# Patient Record
Sex: MALE | Race: WHITE | Employment: UNEMPLOYED | URBAN - METROPOLITAN AREA
[De-identification: names, ages, dates, MRNs, and addresses within clinical notes are randomized per-mention and may not be internally consistent; named-entity substitution may affect disease eponyms.]

---

## 2022-11-18 ENCOUNTER — APPOINTMENT (OUTPATIENT)
Dept: GENERAL RADIOLOGY | Age: 62
DRG: 137 | End: 2022-11-18
Attending: EMERGENCY MEDICINE
Payer: MEDICAID

## 2022-11-18 ENCOUNTER — APPOINTMENT (OUTPATIENT)
Dept: CT IMAGING | Age: 62
DRG: 137 | End: 2022-11-18
Attending: EMERGENCY MEDICINE
Payer: MEDICAID

## 2022-11-18 ENCOUNTER — HOSPITAL ENCOUNTER (INPATIENT)
Age: 62
LOS: 3 days | Discharge: NURSING, REHAB OR LONG-TERM CARE | DRG: 137 | End: 2022-11-21
Attending: EMERGENCY MEDICINE | Admitting: HOSPITALIST
Payer: MEDICAID

## 2022-11-18 ENCOUNTER — HOSPITAL ENCOUNTER (EMERGENCY)
Age: 62
Discharge: STILL ADMITTED OR ADMINISTRATIVE | End: 2022-11-18
Attending: EMERGENCY MEDICINE

## 2022-11-18 DIAGNOSIS — J96.01 ACUTE RESPIRATORY FAILURE WITH HYPOXIA (HCC): Primary | ICD-10-CM

## 2022-11-18 DIAGNOSIS — U07.1 COVID-19: ICD-10-CM

## 2022-11-18 PROBLEM — R41.82 AMS (ALTERED MENTAL STATUS): Status: ACTIVE | Noted: 2022-11-18

## 2022-11-18 LAB
ALBUMIN SERPL-MCNC: 2.9 G/DL (ref 3.5–5)
ALBUMIN/GLOB SERPL: 0.6 {RATIO} (ref 1.1–2.2)
ALP SERPL-CCNC: 81 U/L (ref 45–117)
ALT SERPL-CCNC: 52 U/L (ref 12–78)
ANION GAP SERPL CALC-SCNC: 2 MMOL/L (ref 5–15)
ARTERIAL PATENCY WRIST A: YES
ARTERIAL PATENCY WRIST A: YES
AST SERPL-CCNC: 36 U/L (ref 15–37)
BASE DEFICIT BLDA-SCNC: 0.8 MMOL/L
BASE EXCESS BLDA CALC-SCNC: 0.1 MMOL/L
BASOPHILS # BLD: 0 K/UL (ref 0–0.1)
BASOPHILS NFR BLD: 0 % (ref 0–1)
BDY SITE: ABNORMAL
BDY SITE: ABNORMAL
BILIRUB SERPL-MCNC: 0.4 MG/DL (ref 0.2–1)
BNP SERPL-MCNC: ABNORMAL PG/ML
BREATHS.SPONTANEOUS ON VENT: 16
BUN SERPL-MCNC: 23 MG/DL (ref 6–20)
BUN/CREAT SERPL: 21 (ref 12–20)
CALCIUM SERPL-MCNC: 8.8 MG/DL (ref 8.5–10.1)
CHLORIDE SERPL-SCNC: 102 MMOL/L (ref 97–108)
CO2 SERPL-SCNC: 36 MMOL/L (ref 21–32)
COMMENT, HOLDF: NORMAL
COVID-19 RAPID TEST, COVR: DETECTED
CREAT SERPL-MCNC: 1.12 MG/DL (ref 0.7–1.3)
CRP SERPL-MCNC: 1.24 MG/DL (ref 0–0.6)
DIFFERENTIAL METHOD BLD: ABNORMAL
EOSINOPHIL # BLD: 0 K/UL (ref 0–0.4)
EOSINOPHIL NFR BLD: 0 % (ref 0–7)
ERYTHROCYTE [DISTWIDTH] IN BLOOD BY AUTOMATED COUNT: 16.7 % (ref 11.5–14.5)
FERRITIN SERPL-MCNC: 96 NG/ML (ref 26–388)
FIO2 ON VENT: 50 %
FLUAV AG NPH QL IA: NEGATIVE
FLUBV AG NOSE QL IA: NEGATIVE
GAS FLOW.O2 O2 DELIVERY SYS: 15 L/MIN
GAS FLOW.O2 SETTING OXYMISER: 4 L/MIN
GLOBULIN SER CALC-MCNC: 4.5 G/DL (ref 2–4)
GLUCOSE BLD STRIP.AUTO-MCNC: 187 MG/DL (ref 65–117)
GLUCOSE SERPL-MCNC: 177 MG/DL (ref 65–100)
HCO3 BLDA-SCNC: 29 MMOL/L (ref 22–26)
HCO3 BLDA-SCNC: 33 MMOL/L (ref 22–26)
HCT VFR BLD AUTO: 48.8 % (ref 36.6–50.3)
HGB BLD-MCNC: 14 G/DL (ref 12.1–17)
IMM GRANULOCYTES # BLD AUTO: 0.1 K/UL (ref 0–0.04)
IMM GRANULOCYTES NFR BLD AUTO: 1 % (ref 0–0.5)
IPAP/PIP, IPAPIP: 16
LACTATE SERPL-SCNC: 1.7 MMOL/L (ref 0.4–2)
LDH SERPL L TO P-CCNC: 417 U/L (ref 85–241)
LYMPHOCYTES # BLD: 0.7 K/UL (ref 0.8–3.5)
LYMPHOCYTES NFR BLD: 8 % (ref 12–49)
MAGNESIUM SERPL-MCNC: 2.2 MG/DL (ref 1.6–2.4)
MCH RBC QN AUTO: 26.9 PG (ref 26–34)
MCHC RBC AUTO-ENTMCNC: 28.7 G/DL (ref 30–36.5)
MCV RBC AUTO: 93.7 FL (ref 80–99)
MONOCYTES # BLD: 0.5 K/UL (ref 0–1)
MONOCYTES NFR BLD: 6 % (ref 5–13)
NEUTS SEG # BLD: 7.1 K/UL (ref 1.8–8)
NEUTS SEG NFR BLD: 85 % (ref 32–75)
NRBC # BLD: 0 K/UL (ref 0–0.01)
NRBC BLD-RTO: 0 PER 100 WBC
PCO2 BLDA: 76 MMHG (ref 35–45)
PCO2 BLDA: 98 MMHG (ref 35–45)
PEEP RESPIRATORY: 8 CM[H2O]
PH BLDA: 7.14 [PH] (ref 7.35–7.45)
PH BLDA: 7.21 [PH] (ref 7.35–7.45)
PLATELET # BLD AUTO: 201 K/UL (ref 150–400)
PMV BLD AUTO: 9.1 FL (ref 8.9–12.9)
PO2 BLDA: 124 MMHG (ref 80–100)
PO2 BLDA: 75 MMHG (ref 80–100)
POTASSIUM SERPL-SCNC: 5.3 MMOL/L (ref 3.5–5.1)
PROCALCITONIN SERPL-MCNC: <0.05 NG/ML
PROT SERPL-MCNC: 7.4 G/DL (ref 6.4–8.2)
RBC # BLD AUTO: 5.21 M/UL (ref 4.1–5.7)
RBC MORPH BLD: ABNORMAL
SAMPLES BEING HELD,HOLD: NORMAL
SAO2 % BLD: 91 % (ref 92–97)
SAO2 % BLD: 97 % (ref 92–97)
SAO2% DEVICE SAO2% SENSOR NAME: ABNORMAL
SAO2% DEVICE SAO2% SENSOR NAME: ABNORMAL
SERVICE CMNT-IMP: ABNORMAL
SERVICE CMNT-IMP: ABNORMAL
SODIUM SERPL-SCNC: 140 MMOL/L (ref 136–145)
SOURCE, COVRS: ABNORMAL
SPECIMEN SITE: ABNORMAL
SPECIMEN SITE: ABNORMAL
TROPONIN-HIGH SENSITIVITY: 100 NG/L (ref 0–76)
TROPONIN-HIGH SENSITIVITY: 100 NG/L (ref 0–76)
VENTILATION MODE VENT: ABNORMAL
WBC # BLD AUTO: 8.4 K/UL (ref 4.1–11.1)

## 2022-11-18 PROCEDURE — 83880 ASSAY OF NATRIURETIC PEPTIDE: CPT

## 2022-11-18 PROCEDURE — 99285 EMERGENCY DEPT VISIT HI MDM: CPT

## 2022-11-18 PROCEDURE — 87040 BLOOD CULTURE FOR BACTERIA: CPT

## 2022-11-18 PROCEDURE — 71045 X-RAY EXAM CHEST 1 VIEW: CPT

## 2022-11-18 PROCEDURE — 85025 COMPLETE CBC W/AUTO DIFF WBC: CPT

## 2022-11-18 PROCEDURE — 82728 ASSAY OF FERRITIN: CPT

## 2022-11-18 PROCEDURE — 80053 COMPREHEN METABOLIC PANEL: CPT

## 2022-11-18 PROCEDURE — 84484 ASSAY OF TROPONIN QUANT: CPT

## 2022-11-18 PROCEDURE — 83615 LACTATE (LD) (LDH) ENZYME: CPT

## 2022-11-18 PROCEDURE — 70450 CT HEAD/BRAIN W/O DYE: CPT

## 2022-11-18 PROCEDURE — 74011250636 HC RX REV CODE- 250/636: Performed by: HOSPITALIST

## 2022-11-18 PROCEDURE — 65270000046 HC RM TELEMETRY

## 2022-11-18 PROCEDURE — 36600 WITHDRAWAL OF ARTERIAL BLOOD: CPT

## 2022-11-18 PROCEDURE — 74011250637 HC RX REV CODE- 250/637: Performed by: HOSPITALIST

## 2022-11-18 PROCEDURE — 87449 NOS EACH ORGANISM AG IA: CPT

## 2022-11-18 PROCEDURE — 87635 SARS-COV-2 COVID-19 AMP PRB: CPT

## 2022-11-18 PROCEDURE — 82803 BLOOD GASES ANY COMBINATION: CPT

## 2022-11-18 PROCEDURE — 36415 COLL VENOUS BLD VENIPUNCTURE: CPT

## 2022-11-18 PROCEDURE — 74011000250 HC RX REV CODE- 250: Performed by: EMERGENCY MEDICINE

## 2022-11-18 PROCEDURE — 5A09457 ASSISTANCE WITH RESPIRATORY VENTILATION, 24-96 CONSECUTIVE HOURS, CONTINUOUS POSITIVE AIRWAY PRESSURE: ICD-10-PCS | Performed by: HOSPITALIST

## 2022-11-18 PROCEDURE — 83735 ASSAY OF MAGNESIUM: CPT

## 2022-11-18 PROCEDURE — 74011000250 HC RX REV CODE- 250: Performed by: HOSPITALIST

## 2022-11-18 PROCEDURE — 84145 PROCALCITONIN (PCT): CPT

## 2022-11-18 PROCEDURE — 71275 CT ANGIOGRAPHY CHEST: CPT

## 2022-11-18 PROCEDURE — 87804 INFLUENZA ASSAY W/OPTIC: CPT

## 2022-11-18 PROCEDURE — 74011000636 HC RX REV CODE- 636

## 2022-11-18 PROCEDURE — 96374 THER/PROPH/DIAG INJ IV PUSH: CPT

## 2022-11-18 PROCEDURE — 86140 C-REACTIVE PROTEIN: CPT

## 2022-11-18 PROCEDURE — 74011250636 HC RX REV CODE- 250/636: Performed by: EMERGENCY MEDICINE

## 2022-11-18 PROCEDURE — 82962 GLUCOSE BLOOD TEST: CPT

## 2022-11-18 PROCEDURE — 83605 ASSAY OF LACTIC ACID: CPT

## 2022-11-18 RX ORDER — SODIUM CHLORIDE 0.9 % (FLUSH) 0.9 %
5-40 SYRINGE (ML) INJECTION AS NEEDED
Status: DISCONTINUED | OUTPATIENT
Start: 2022-11-18 | End: 2022-11-21 | Stop reason: HOSPADM

## 2022-11-18 RX ORDER — IPRATROPIUM BROMIDE AND ALBUTEROL SULFATE 2.5; .5 MG/3ML; MG/3ML
3 SOLUTION RESPIRATORY (INHALATION)
Status: DISCONTINUED | OUTPATIENT
Start: 2022-11-18 | End: 2022-11-21 | Stop reason: HOSPADM

## 2022-11-18 RX ORDER — MAGNESIUM SULFATE 100 %
4 CRYSTALS MISCELLANEOUS AS NEEDED
Status: DISCONTINUED | OUTPATIENT
Start: 2022-11-18 | End: 2022-11-21 | Stop reason: HOSPADM

## 2022-11-18 RX ORDER — SODIUM CHLORIDE 9 MG/ML
100 INJECTION, SOLUTION INTRAVENOUS CONTINUOUS
Status: DISCONTINUED | OUTPATIENT
Start: 2022-11-18 | End: 2022-11-21 | Stop reason: HOSPADM

## 2022-11-18 RX ORDER — ALBUTEROL SULFATE 0.83 MG/ML
2.5 SOLUTION RESPIRATORY (INHALATION)
Status: DISCONTINUED | OUTPATIENT
Start: 2022-11-18 | End: 2022-11-21 | Stop reason: SDUPTHER

## 2022-11-18 RX ORDER — GUAIFENESIN/DEXTROMETHORPHAN 100-10MG/5
5 SYRUP ORAL
Status: DISCONTINUED | OUTPATIENT
Start: 2022-11-18 | End: 2022-11-21 | Stop reason: HOSPADM

## 2022-11-18 RX ORDER — ALBUTEROL SULFATE 0.83 MG/ML
10 SOLUTION RESPIRATORY (INHALATION)
Status: COMPLETED | OUTPATIENT
Start: 2022-11-18 | End: 2022-11-18

## 2022-11-18 RX ORDER — ACETAMINOPHEN 325 MG/1
650 TABLET ORAL
Status: DISCONTINUED | OUTPATIENT
Start: 2022-11-18 | End: 2022-11-21 | Stop reason: HOSPADM

## 2022-11-18 RX ORDER — DEXAMETHASONE SODIUM PHOSPHATE 4 MG/ML
6 INJECTION, SOLUTION INTRA-ARTICULAR; INTRALESIONAL; INTRAMUSCULAR; INTRAVENOUS; SOFT TISSUE
Status: COMPLETED | OUTPATIENT
Start: 2022-11-18 | End: 2022-11-18

## 2022-11-18 RX ORDER — MELATONIN
1000 DAILY
Status: DISCONTINUED | OUTPATIENT
Start: 2022-11-19 | End: 2022-11-21 | Stop reason: HOSPADM

## 2022-11-18 RX ORDER — INSULIN LISPRO 100 [IU]/ML
INJECTION, SOLUTION INTRAVENOUS; SUBCUTANEOUS EVERY 6 HOURS
Status: DISCONTINUED | OUTPATIENT
Start: 2022-11-18 | End: 2022-11-21 | Stop reason: HOSPADM

## 2022-11-18 RX ORDER — ENOXAPARIN SODIUM 100 MG/ML
30 INJECTION SUBCUTANEOUS EVERY 12 HOURS
Status: DISCONTINUED | OUTPATIENT
Start: 2022-11-18 | End: 2022-11-21 | Stop reason: HOSPADM

## 2022-11-18 RX ORDER — DEXAMETHASONE SODIUM PHOSPHATE 10 MG/ML
6 INJECTION INTRAMUSCULAR; INTRAVENOUS EVERY 24 HOURS
Status: DISCONTINUED | OUTPATIENT
Start: 2022-11-18 | End: 2022-11-20

## 2022-11-18 RX ORDER — ACETAMINOPHEN 650 MG/1
650 SUPPOSITORY RECTAL
Status: DISCONTINUED | OUTPATIENT
Start: 2022-11-18 | End: 2022-11-21 | Stop reason: HOSPADM

## 2022-11-18 RX ORDER — LANOLIN ALCOHOL/MO/W.PET/CERES
3 CREAM (GRAM) TOPICAL
Status: DISCONTINUED | OUTPATIENT
Start: 2022-11-18 | End: 2022-11-21 | Stop reason: HOSPADM

## 2022-11-18 RX ORDER — ZINC SULFATE 50(220)MG
1 CAPSULE ORAL DAILY
Status: DISCONTINUED | OUTPATIENT
Start: 2022-11-19 | End: 2022-11-21 | Stop reason: HOSPADM

## 2022-11-18 RX ORDER — FACIAL-BODY WIPES
10 EACH TOPICAL DAILY PRN
Status: DISCONTINUED | OUTPATIENT
Start: 2022-11-18 | End: 2022-11-21 | Stop reason: HOSPADM

## 2022-11-18 RX ORDER — DEXTROSE MONOHYDRATE 100 MG/ML
0-250 INJECTION, SOLUTION INTRAVENOUS AS NEEDED
Status: DISCONTINUED | OUTPATIENT
Start: 2022-11-18 | End: 2022-11-21 | Stop reason: HOSPADM

## 2022-11-18 RX ORDER — ONDANSETRON 2 MG/ML
4 INJECTION INTRAMUSCULAR; INTRAVENOUS
Status: DISCONTINUED | OUTPATIENT
Start: 2022-11-18 | End: 2022-11-21 | Stop reason: HOSPADM

## 2022-11-18 RX ORDER — POLYETHYLENE GLYCOL 3350 17 G/17G
17 POWDER, FOR SOLUTION ORAL DAILY PRN
Status: DISCONTINUED | OUTPATIENT
Start: 2022-11-18 | End: 2022-11-21 | Stop reason: HOSPADM

## 2022-11-18 RX ORDER — GUAIFENESIN 600 MG/1
1200 TABLET, EXTENDED RELEASE ORAL 2 TIMES DAILY
Status: DISCONTINUED | OUTPATIENT
Start: 2022-11-18 | End: 2022-11-21 | Stop reason: HOSPADM

## 2022-11-18 RX ORDER — SODIUM CHLORIDE 0.9 % (FLUSH) 0.9 %
5-40 SYRINGE (ML) INJECTION EVERY 8 HOURS
Status: DISCONTINUED | OUTPATIENT
Start: 2022-11-18 | End: 2022-11-21 | Stop reason: HOSPADM

## 2022-11-18 RX ORDER — ARFORMOTEROL TARTRATE 15 UG/2ML
15 SOLUTION RESPIRATORY (INHALATION)
Status: DISCONTINUED | OUTPATIENT
Start: 2022-11-18 | End: 2022-11-20

## 2022-11-18 RX ORDER — ASCORBIC ACID 500 MG
500 TABLET ORAL 2 TIMES DAILY
Status: DISCONTINUED | OUTPATIENT
Start: 2022-11-18 | End: 2022-11-21 | Stop reason: HOSPADM

## 2022-11-18 RX ADMIN — OXYCODONE HYDROCHLORIDE AND ACETAMINOPHEN 500 MG: 500 TABLET ORAL at 17:34

## 2022-11-18 RX ADMIN — DEXAMETHASONE SODIUM PHOSPHATE 6 MG: 10 INJECTION, SOLUTION INTRAMUSCULAR; INTRAVENOUS at 15:33

## 2022-11-18 RX ADMIN — SODIUM CHLORIDE, PRESERVATIVE FREE 10 ML: 5 INJECTION INTRAVENOUS at 16:29

## 2022-11-18 RX ADMIN — GUAIFENESIN 1200 MG: 600 TABLET ORAL at 21:23

## 2022-11-18 RX ADMIN — SODIUM CHLORIDE, PRESERVATIVE FREE 10 ML: 5 INJECTION INTRAVENOUS at 21:24

## 2022-11-18 RX ADMIN — DEXAMETHASONE SODIUM PHOSPHATE 6 MG: 4 INJECTION, SOLUTION INTRAMUSCULAR; INTRAVENOUS at 10:14

## 2022-11-18 RX ADMIN — IOPAMIDOL 80 ML: 755 INJECTION, SOLUTION INTRAVENOUS at 12:11

## 2022-11-18 RX ADMIN — Medication 3 MG: at 21:23

## 2022-11-18 RX ADMIN — ALBUTEROL SULFATE 10 MG: 2.5 SOLUTION RESPIRATORY (INHALATION) at 09:58

## 2022-11-18 RX ADMIN — SODIUM CHLORIDE 100 ML/HR: 9 INJECTION, SOLUTION INTRAVENOUS at 15:27

## 2022-11-18 RX ADMIN — ENOXAPARIN SODIUM 30 MG: 100 INJECTION SUBCUTANEOUS at 15:28

## 2022-11-18 NOTE — H&P
[facility], [place]  [address], [facility] [address]  [phone]         Hospitalist Admission Note        NAME:  Man Montoya   :  1960   MRN:  [id number]    Date/Time:  2022     Patient PCP:  None    Emergency Contact:    Extended Emergency Contact Information  Primary Emergency Contact: None,Given   [place]  Relation: None      Code: Full Code     Isolation Precautions: Droplet Plus, Contact, Droplet, Enteric Contact, Droplet Plus        Subjective:     Chief Complaint: AMS and SOB with COVID-19    History of Present Illness:     Mr. Narda Johnson is a 60 y.o. male with history that includes COPD, BPH, seizure history but no medications, question of diabetes neuropathy with elevated blood sugar and who comes from long-term care facility was diagnosed with COVID-19 and was about to come off isolation today however he was found to have altered mental status along with pulse ox of 55. He was bagged and sent to the ER. He was started on mid flow which initially thought to respond well but continued to desaturate and eventually required BiPAP. At the time of my visit patient was on BiPAP and although he appeared to be somewhat oriented he was somnolent and my review of system and history was very limited by this. Allergies  No Known Allergies    Medications  Prior to Admission medications    Medication Sig Start Date End Date Taking? Authorizing Provider   phenytoin ER (DILANTIN) 100 mg ER capsule Take 3 Caps by mouth nightly. 16   Sis Milner MD   LORazepam (ATIVAN) 2 mg tablet Take 1 Tab by mouth every eight (8) hours as needed for Anxiety.  Max Daily Amount: 6 mg. 16   Sis Milner MD        Past Medical History  Past Medical History:   Diagnosis Date    Diabetes ([address]     Epile[address] ([facility] [address]     ETOH abuse     Hypertension         Past Surgical History  Past Surgical History:   Procedure Laterality Date    HX AMPUTATION      Right, 2 toes    HX KNEE REPLACEMENT      Right       Social History  Social History     Tobacco Use    Smoking status: Every Day     Packs/day: 1.00     Years: 46.00     Pack years: 46.00     Types: Cigarettes    Smokeless tobacco: Not on file   Substance Use Topics    Alcohol use: Yes     Comment: \"two 40's/day\"        Family History  Unable to obtain due to patient's somnolence and BiPAP status      Review of Systems (14 point ROS):    Resp:  shortness of breath  CVS:   denies chest pain  GI:       denies abdominal pain  Unable to obtain the remainder of the 14 point ROS due to BiPAP status and somnolence. Objective:      Visit Vitals  /65   Pulse 71   Temp 97.8 °F (36.6 °C)   Resp 23   Ht 5' 9\" (1.753 m)   Wt 81.6 kg (180 lb)   SpO2 97%   BMI 26.58 kg/m²       Physical Exam:  General: lethargic, respiratory distress, ill-appearing, and chronically ill appearing  Head: Normocephalic, without obvious abnormality, atraumatic  Eyes: PERRL, EOMI, anicteric sclerae, and conjuntiva clear  ENT: lips, mucosa, and tongue normal on limited exam due to BiPAP  Neck: normal, supple, and no tenderness  Lungs: crackles bilaterally, rhonchi bilaterally, wheezing bilaterally, increased respiratory effort, and on BiPAP  Heart: S1, S2 normal, regular rate, and regular rhythm  Abd: not distended, soft, nontender, BS present and normactive  Ext: no cyanosis, bilateral lower extremity 2+ edema, and contracture of left leg.   Also has multiple toe indications on the right foot  Skin: normal skin color  Neuro: Cannot formally assess at this time due to respiratory status, BiPAP, and somnolence  Psych: Patient does not appear anxious but cannot formally assess as described above      Labs:  Recent Labs     11/18/22  0932   WBC 8.4   HGB 14.0   HCT 48.8        Recent Labs     11/18/22  0932      K 5.3*      CO2 36*   *   BUN 23*   CREA 1.12   CA 8.8   MG 2.2   ALB 2.9*   TBILI 0.4   ALT 52 Radiology:  CT HEAD WO CONT    Result Date: 11/18/2022  No acute intracranial abnormality identified. CTA CHEST W OR W WO CONT    Result Date: 11/18/2022  1. No evidence of pulmonary arterial thromboembolism. 2. Small bilateral pleural effusions. There is bilateral lower lobe volume loss. 3. Acute-appearing, nondisplaced fracture of the RIGHT seventh rib. XR CHEST PORT    Result Date: 11/18/2022  Cardiomegaly. Low lung volumes with mild bibasilar atelectasis. I personally reviewed and interpreted the imaging studies and agree with official reading      The ER course, labs, imaging studies, and medications was reviewed by me on: November 18, 2022         Assessment/Plan:      Acute respiratory failure with hypoxia due to COVID-19 AND COPD  Will admit for further workup and treatment of this very ill patient and high risk of decompensation due to  respiratory compromise     COVID-19 order set with DROPLET PLUS precautions, IVFs, antibiotic(s) Azithromycin, Rocephin, and not needed with no pneumonia and normal procalcitonin, Zinc, vitamin C, vitamin D, bronchodilators, Decadron for hypoxia and/or resp. distress, Maintain O2 sats at least 92-94%, supplemental O2 per protocol, supportive care, incentive spirometry, monitor labs, follow inflammatory markers, and consider pulmonary consult if not improving or worsens    COVID-19 virus infection  Supportive care and treat as above    COPD with acute exacerbation due to COVID-19  For the most part treat as above with Decadron, will add inhaled corticosteroids and bronchodilators. Monitor    Acute hypoxic encephalopathy POA  Seems to be improving continue treating hypoxia. Monitor    Elevated troponin and BNP POA  No chest pain but will continue to monitor likely elevated by the infection. Elevated glucose hypoglycemia unsure about history of diabetes type 2 with neuropathy  Monitor blood glucose levels with insulin sliding scale coverage.   No basal coverage for now. Monitor for complications. A1C. Hypertension  Per chart but not taking medications for it.     Hyperkalemia mild 5.3 POA  IV fluids and monitor    Body mass index is 26.58 kg/m².:  25.0 - 29.9:  Overweight    F:   ml/hr  E:  HyperK  N:  ADULT DIET Regular; 3 carb choices (45 gm/meal)     Risk of deterioration: high      Discussed:  Pt's condition, Imaging findings, Lab findings, Assessment, and Care Plan discussed with: Patient, ED Provider, and RN    Prophylaxis:  Lovenox SQ    Probable disposition:  TBD based on hospital course for now will be admitted to stepdown unit      Total time: 75 minutes **I personally saw and examined the patient during this time period**      Date of service:    11/18/2022                ___________________________________________________    Admitting Physician: Fatemeh Fox MD         CC: None

## 2022-11-18 NOTE — ED PROVIDER NOTES
Patient is a 27-year-old male with past medical history significant for diabetes, epilepsy, EtOH dependence in the past, hypertension status post amputation of 2 toes on the right and right knee replacement presenting to the ER via EMS from long-term care facility for evaluation of altered mental status in setting of hypoxia. Apparently patient was on COVID isolation or quarantine at the facility and was due to come off today however when they rounded on him this morning he was found unresponsive in bed with sats in the 50s. Patient is on 3 L nasal cannula at baseline but had sats this low on his normal oxygen. Patient was placed on nonrebreather and had improvement of sensorium had O2 sats of the way here. Past Medical History:   Diagnosis Date    Diabetes ([facility] [address]     Epilepsy ([facility] [address]     ETOH abuse     Hypertension        Past Surgical History:   Procedure Laterality Date    HX AMPUTATION      Right, 2 toes    HX KNEE REPLACEMENT      Right         No family history on file.     Social History     Socioeconomic History    Marital status: SINGLE     Spouse name: Not on file    Number of children: Not on file    Years of education: Not on file    Highest education level: Not on file   Occupational History    Not on file   Tobacco Use    Smoking status: Every Day     Packs/day: 1.00     Years: 46.00     Pack years: 46.00     Types: Cigarettes    Smokeless tobacco: Not on file   Substance and Sexual Activity    Alcohol use: Yes     Comment: \"two 40's/day\"    Drug use: No    Sexual activity: Not on file   Other Topics Concern    Not on file   Social History Narrative    Not on file     Social Determinants of Health     Financial Resource Strain: Not on file   Food Insecurity: Not on file   Transportation Needs: Not on file   Physical Activity: Not on file   Stress: Not on file   Social Connections: Not on file   Intimate Partner Violence: Not on file   Housing Stability: Not on file         ALLERGIES: Patient has no known allergies. Review of Systems   Unable to perform ROS: Severe respiratory distress     Vitals:    11/18/22 1500 11/18/22 1516 11/18/22 1623 11/18/22 1724   BP: 130/71  100/66    Pulse: 69  74    Resp: 26  17    Temp:  97.6 °F (36.4 °C) 97 °F (36.1 °C)    SpO2: 97%  92% 95%   Weight:       Height:                Physical Exam  Vitals and nursing note reviewed. Constitutional:       Appearance: He is obese. HENT:      Head: Normocephalic and atraumatic. Right Ear: External ear normal.      Left Ear: External ear normal.      Mouth/Throat:      Mouth: Mucous membranes are dry. Eyes:      Conjunctiva/sclera: Conjunctivae normal.   Cardiovascular:      Rate and Rhythm: Tachycardia present. Pulmonary:      Effort: Respiratory distress present. Breath sounds: No stridor. Wheezing and rhonchi present. Abdominal:      Palpations: Abdomen is soft. Musculoskeletal:      Cervical back: Neck supple. Comments: Left leg Contracted which per chart is baseline   Skin:     General: Skin is warm and dry. Neurological:      Mental Status: He is alert and oriented to person, place, and time. MDM  Number of Diagnoses or Management Options  Acute respiratory failure with hypoxia ([address]  COVID-19  Diagnosis management comments: ECG obtained at 930 showing sinus rhythm, rate 90, some ectopy noted and baseline artifact, T wave inversions in anterior leads, no prior EKG available for comparison.        Amount and/or Complexity of Data Reviewed  Clinical lab tests: reviewed and ordered  Tests in the radiology section of CPT®: ordered and reviewed  Discuss the patient with other providers: yes  Independent visualization of images, tracings, or specimens: yes    Risk of Complications, Morbidity, and/or Mortality  Presenting problems: high  Diagnostic procedures: high  Management options: high    Patient Progress  Patient progress: improved         Critical Care  Performed by: Chava Menon MD  Authorized by: Rodolfo Allen MD     Critical care provider statement:     Critical care time (minutes):  60    Critical care time was exclusive of:  Separately billable procedures and treating other patients    Critical care was necessary to treat or prevent imminent or life-threatening deterioration of the following conditions:  Respiratory failure    Critical care was time spent personally by me on the following activities:  Blood draw for specimens, development of treatment plan with patient or surrogate, discussions with consultants, evaluation of patient's response to treatment, examination of patient, ordering and performing treatments and interventions, ordering and review of laboratory studies, ordering and review of radiographic studies, pulse oximetry, re-evaluation of patient's condition, review of old charts and obtaining history from patient or surrogate    I assumed direction of critical care for this patient from another provider in my specialty: no      Care discussed with: admitting provider          Jasper Serve Consult for Admission  12:36 PM    ED Room Number: 322/01  Patient Name and age:  Naz Chaudhry 60 y.o.  male  Working Diagnosis:   1. Acute respiratory failure with hypoxia ([address]    2. COVID-19        COVID-19 Suspicion:  yes  Sepsis present:  no  Reassessment needed: no  Code Status:  Full Code  Readmission: no  Isolation Requirements:  yes  Recommended Level of Care:  step down  Department:[facility] ED - [phone]  Other: Is a 27-year-old male with past medical history significant for coPD, past EtOH abuse, dm, hypertension, neuropathy, from long-term care facility for altered mental status associated with significant hypoxia into the 50s. He is COVID-positive and has had rapid improvement of his mentation with mid flow and then. Blood gases are improving.   CTA read is pending

## 2022-11-18 NOTE — PROGRESS NOTES
RRT RN rounded on patient for a DI score of 66- on hi flow. Patient resting in bed eating dinner in no acute distress, on 5L NC with oxygen saturations at 94%. Spoke with primary RN about patient's condition and she denies need for assistance at this time. No further intervention warranted by RRT RN.

## 2022-11-18 NOTE — ED TRIAGE NOTES
Patient presents via EMS from [facility]-     EMS reports the patient has been on Covid-19 quarantine and was supposed to be coming off quarantine today. The NP at the nursing home was rounding when she found the patient with a RR of 6 and a pulse ox of 55%. Patient was bagged by EMS and he had improvement so was placed on a non-rebreather. Patients GCS improved from a 5 to a 9 in route. Patient arrived with a pulse ox of 98% on a non-rebreather and is responsive to verbal stimuli. IV started in the left [facility]- blood glucose of 201- IVF given.

## 2022-11-18 NOTE — PROGRESS NOTES
Spiritual Care Assessment/Progress Note  [address]      NAME: Jose Miguel Vilchis      MRN: [id number]  AGE: 60 y.o. SEX: male  Mandaeism Affiliation: Mu-ism   Language: English     11/18/2022     Total Time (in minutes): 30     Spiritual Assessment begun in [facility] EMERGENCY DEPT through conversation with:         []Patient        [] Family    [] Friend(s)        Reason for Consult: Request by staff     Spiritual beliefs: (Please include comment if needed)     [] Identifies with a benitez tradition:    Mu-ism on file      [] Supported by a benitez community:            [] Claims no spiritual orientation:           [] Seeking spiritual identity:                [] Adheres to an individual form of spirituality:           [x] Not able to assess:                           Identified resources for coping:      [] Prayer                               [] Music                  [] Guided Imagery     [] Family/friends                 [] Pet visits     [] Devotional reading                         [x] Unknown     [] Other:                                               Interventions offered during this visit: (See comments for more details)                Plan of Care:     [] Support spiritual and/or cultural needs    [] Support AMD and/or advance care planning process      [] Support grieving process   [] Coordinate Rites and/or Rituals    [] Coordination with community clergy   [] No spiritual needs identified at this time   [] Detailed Plan of Care below (See Comments)  [] Make referral to Music Therapy  [] Make referral to Pet Therapy     [] Make referral to Addiction services  [] Make referral to [facility]  [] Make referral to Spiritual Care Partner  [] No future visits requested        [x] Contact Spiritual Care for further referrals     Comments:   Emergency Department notified  that Pt arriving by EMS shortly was unresponsive. Pt arrived to ED-01 and is somewhat responsive now.  Medical and raspatory staff is working with Mr Mariely Levin.  consulted with EMS who picked up Mr Joann Spine where he lives in a nursing home/assisted living. He has a brother that has been contacted and people calling to check on him. No family or visitors present as of yet. Consulted with staff and advised nurse to contact [facility] for any further referrals.     Chaplain Lobo Diop M.Div.  Tavo Alexander ([id number])

## 2022-11-19 LAB
25(OH)D3 SERPL-MCNC: 116.6 NG/ML (ref 30–100)
ALBUMIN SERPL-MCNC: 3.1 G/DL (ref 3.5–5)
ALBUMIN/GLOB SERPL: 0.7 {RATIO} (ref 1.1–2.2)
ALP SERPL-CCNC: 74 U/L (ref 45–117)
ALT SERPL-CCNC: 44 U/L (ref 12–78)
ANION GAP SERPL CALC-SCNC: 1 MMOL/L (ref 5–15)
APTT PPP: 21.6 SEC (ref 22.1–31)
AST SERPL-CCNC: 22 U/L (ref 15–37)
BASOPHILS # BLD: 0 K/UL (ref 0–0.1)
BASOPHILS NFR BLD: 0 % (ref 0–1)
BILIRUB SERPL-MCNC: 0.4 MG/DL (ref 0.2–1)
BUN SERPL-MCNC: 21 MG/DL (ref 6–20)
BUN/CREAT SERPL: 25 (ref 12–20)
CALCIUM SERPL-MCNC: 8.8 MG/DL (ref 8.5–10.1)
CHLORIDE SERPL-SCNC: 99 MMOL/L (ref 97–108)
CO2 SERPL-SCNC: 38 MMOL/L (ref 21–32)
CREAT SERPL-MCNC: 0.85 MG/DL (ref 0.7–1.3)
CRP SERPL-MCNC: 1.03 MG/DL (ref 0–0.6)
D DIMER PPP FEU-MCNC: 35.2 MG/L FEU (ref 0–0.65)
DIFFERENTIAL METHOD BLD: ABNORMAL
EOSINOPHIL # BLD: 0 K/UL (ref 0–0.4)
EOSINOPHIL NFR BLD: 0 % (ref 0–7)
ERYTHROCYTE [DISTWIDTH] IN BLOOD BY AUTOMATED COUNT: 17.2 % (ref 11.5–14.5)
EST. AVERAGE GLUCOSE BLD GHB EST-MCNC: 114 MG/DL
FIBRINOGEN PPP-MCNC: 257 MG/DL (ref 200–475)
GLOBULIN SER CALC-MCNC: 4.2 G/DL (ref 2–4)
GLUCOSE BLD STRIP.AUTO-MCNC: 103 MG/DL (ref 65–117)
GLUCOSE BLD STRIP.AUTO-MCNC: 108 MG/DL (ref 65–117)
GLUCOSE BLD STRIP.AUTO-MCNC: 127 MG/DL (ref 65–117)
GLUCOSE BLD STRIP.AUTO-MCNC: 98 MG/DL (ref 65–117)
GLUCOSE SERPL-MCNC: 93 MG/DL (ref 65–100)
HBA1C MFR BLD: 5.6 % (ref 4–5.6)
HCT VFR BLD AUTO: 42.4 % (ref 36.6–50.3)
HGB BLD-MCNC: 12.6 G/DL (ref 12.1–17)
IMM GRANULOCYTES # BLD AUTO: 0 K/UL (ref 0–0.04)
IMM GRANULOCYTES NFR BLD AUTO: 0 % (ref 0–0.5)
INR PPP: 1.2 (ref 0.9–1.1)
LYMPHOCYTES # BLD: 1.3 K/UL (ref 0.8–3.5)
LYMPHOCYTES NFR BLD: 13 % (ref 12–49)
MAGNESIUM SERPL-MCNC: 2.1 MG/DL (ref 1.6–2.4)
MCH RBC QN AUTO: 27.3 PG (ref 26–34)
MCHC RBC AUTO-ENTMCNC: 29.7 G/DL (ref 30–36.5)
MCV RBC AUTO: 92 FL (ref 80–99)
MONOCYTES # BLD: 0.9 K/UL (ref 0–1)
MONOCYTES NFR BLD: 10 % (ref 5–13)
NEUTS SEG # BLD: 7.1 K/UL (ref 1.8–8)
NEUTS SEG NFR BLD: 77 % (ref 32–75)
NRBC # BLD: 0 K/UL (ref 0–0.01)
NRBC BLD-RTO: 0 PER 100 WBC
PLATELET # BLD AUTO: 226 K/UL (ref 150–400)
PMV BLD AUTO: 9.4 FL (ref 8.9–12.9)
POTASSIUM SERPL-SCNC: 4.2 MMOL/L (ref 3.5–5.1)
PROCALCITONIN SERPL-MCNC: 0.16 NG/ML
PROT SERPL-MCNC: 7.3 G/DL (ref 6.4–8.2)
PROTHROMBIN TIME: 12.2 SEC (ref 9–11.1)
RBC # BLD AUTO: 4.61 M/UL (ref 4.1–5.7)
SERVICE CMNT-IMP: ABNORMAL
SERVICE CMNT-IMP: NORMAL
SODIUM SERPL-SCNC: 138 MMOL/L (ref 136–145)
THERAPEUTIC RANGE,PTTT: ABNORMAL SECS (ref 58–77)
TROPONIN-HIGH SENSITIVITY: 93 NG/L (ref 0–76)
UR CULT HOLD, URHOLD: NORMAL
WBC # BLD AUTO: 9.3 K/UL (ref 4.1–11.1)

## 2022-11-19 PROCEDURE — 84145 PROCALCITONIN (PCT): CPT

## 2022-11-19 PROCEDURE — 82962 GLUCOSE BLOOD TEST: CPT

## 2022-11-19 PROCEDURE — 65270000046 HC RM TELEMETRY

## 2022-11-19 PROCEDURE — 74011250637 HC RX REV CODE- 250/637: Performed by: HOSPITALIST

## 2022-11-19 PROCEDURE — 85610 PROTHROMBIN TIME: CPT

## 2022-11-19 PROCEDURE — 83735 ASSAY OF MAGNESIUM: CPT

## 2022-11-19 PROCEDURE — 97166 OT EVAL MOD COMPLEX 45 MIN: CPT

## 2022-11-19 PROCEDURE — 77010033678 HC OXYGEN DAILY

## 2022-11-19 PROCEDURE — 94640 AIRWAY INHALATION TREATMENT: CPT

## 2022-11-19 PROCEDURE — 97535 SELF CARE MNGMENT TRAINING: CPT

## 2022-11-19 PROCEDURE — 82306 VITAMIN D 25 HYDROXY: CPT

## 2022-11-19 PROCEDURE — 87899 AGENT NOS ASSAY W/OPTIC: CPT

## 2022-11-19 PROCEDURE — 86140 C-REACTIVE PROTEIN: CPT

## 2022-11-19 PROCEDURE — 85379 FIBRIN DEGRADATION QUANT: CPT

## 2022-11-19 PROCEDURE — 85730 THROMBOPLASTIN TIME PARTIAL: CPT

## 2022-11-19 PROCEDURE — 97161 PT EVAL LOW COMPLEX 20 MIN: CPT

## 2022-11-19 PROCEDURE — 85025 COMPLETE CBC W/AUTO DIFF WBC: CPT

## 2022-11-19 PROCEDURE — 83036 HEMOGLOBIN GLYCOSYLATED A1C: CPT

## 2022-11-19 PROCEDURE — 84484 ASSAY OF TROPONIN QUANT: CPT

## 2022-11-19 PROCEDURE — 85384 FIBRINOGEN ACTIVITY: CPT

## 2022-11-19 PROCEDURE — 74011000250 HC RX REV CODE- 250: Performed by: HOSPITALIST

## 2022-11-19 PROCEDURE — 36415 COLL VENOUS BLD VENIPUNCTURE: CPT

## 2022-11-19 PROCEDURE — 74011250636 HC RX REV CODE- 250/636: Performed by: HOSPITALIST

## 2022-11-19 PROCEDURE — 80053 COMPREHEN METABOLIC PANEL: CPT

## 2022-11-19 PROCEDURE — 97530 THERAPEUTIC ACTIVITIES: CPT

## 2022-11-19 RX ORDER — LORAZEPAM 2 MG/ML
0.5 INJECTION, SOLUTION INTRAMUSCULAR; INTRAVENOUS
Status: DISCONTINUED | OUTPATIENT
Start: 2022-11-19 | End: 2022-11-21 | Stop reason: HOSPADM

## 2022-11-19 RX ADMIN — Medication 1000 UNITS: at 08:35

## 2022-11-19 RX ADMIN — SODIUM CHLORIDE, PRESERVATIVE FREE 10 ML: 5 INJECTION INTRAVENOUS at 21:11

## 2022-11-19 RX ADMIN — SODIUM CHLORIDE, PRESERVATIVE FREE 10 ML: 5 INJECTION INTRAVENOUS at 13:29

## 2022-11-19 RX ADMIN — TIOTROPIUM BROMIDE AND OLODATEROL 2 PUFF: 3.124; 2.736 SPRAY, METERED RESPIRATORY (INHALATION) at 20:50

## 2022-11-19 RX ADMIN — OXYCODONE HYDROCHLORIDE AND ACETAMINOPHEN 500 MG: 500 TABLET ORAL at 08:35

## 2022-11-19 RX ADMIN — TIOTROPIUM BROMIDE AND OLODATEROL 2 PUFF: 3.124; 2.736 SPRAY, METERED RESPIRATORY (INHALATION) at 02:44

## 2022-11-19 RX ADMIN — Medication 3 MG: at 21:10

## 2022-11-19 RX ADMIN — DEXAMETHASONE SODIUM PHOSPHATE 6 MG: 10 INJECTION, SOLUTION INTRAMUSCULAR; INTRAVENOUS at 13:26

## 2022-11-19 RX ADMIN — ENOXAPARIN SODIUM 30 MG: 100 INJECTION SUBCUTANEOUS at 13:33

## 2022-11-19 RX ADMIN — GUAIFENESIN 1200 MG: 600 TABLET ORAL at 21:10

## 2022-11-19 RX ADMIN — SODIUM CHLORIDE 100 ML/HR: 9 INJECTION, SOLUTION INTRAVENOUS at 17:52

## 2022-11-19 RX ADMIN — ZINC SULFATE 220 MG (50 MG) CAPSULE 1 CAPSULE: CAPSULE at 08:35

## 2022-11-19 RX ADMIN — LORAZEPAM 0.5 MG: 2 INJECTION, SOLUTION INTRAMUSCULAR; INTRAVENOUS at 13:26

## 2022-11-19 RX ADMIN — OXYCODONE HYDROCHLORIDE AND ACETAMINOPHEN 500 MG: 500 TABLET ORAL at 17:53

## 2022-11-19 RX ADMIN — GUAIFENESIN 1200 MG: 600 TABLET ORAL at 08:35

## 2022-11-19 NOTE — PROGRESS NOTES
Bedside and Verbal shift change report given to ZOEY White (oncoming nurse) by Savanna Jackson (offgoing nurse). Report included the following information SBAR, Kardex, ED Summary, MAR, Recent Results, and Cardiac Rhythm SR .     1200- Pt is very agitated has pulled off all leads and one IV, refusing to wear oxygen, and is also not letting us check his blood sugar, 02 stats are dropping down into the 80's, notified Dr. Anyi Asif, orders for ativan have been received. This patient was assisted with Intentional Toileting every 2 hours during this shift as appropriate. Documentation of ambulation and output reflected on Flowsheet as appropriate. Purposeful hourly rounding was completed using AIDET and 5Ps. Outcomes of PHR documented as they occurred. Bed alarm in use as appropriate. Dual Suction and ambubag in place. Bedside and Verbal shift change report given to ZOEY Crawford (oncoming nurse) by Shraddha Awad (offgoing nurse).  Report included the following information SBAR, Kardex, ED Summary, MAR, Recent Results, and Cardiac Rhythm SR .

## 2022-11-19 NOTE — PROGRESS NOTES
[facility]  [address] [facility] [facility], [facility] [address]  [phone]         Hospitalist Progress Note        NAME:  Izzy Vang   :  1960   MRN:  [id number]    Date/Time:  2022     Patient PCP:  None    Emergency Contact:    Extended Emergency Contact Information  Primary Emergency Contact: None,Given   [place]  Relation: None      Code: Full Code     Isolation Precautions: Droplet Plus, Contact, Droplet, Enteric Contact, Droplet Plus        Subjective:     REASON FOR VISIT:  Recheck AHRF / COVID-19     HPI & INTERVAL HISTORY:     Mr. Renata Bamberger is a 60 y.o. male with history that includes COPD presents with AMS due to AHRF requiring BiPAP due to COV-19.     : Patient seen and examined. Has improved and is off BiPAP. He remains somewhat disoriented though I don't know his baseline. Unaware of location and date.       ALLERGIES  No Known Allergies      ROS:  Gen:   malaise  Eyes:  negative  ENT:    negative  Resp:  cough and shortness of breath  CVS:   denies palpitations, CP, dizziness, syncope, edema, PND, GLOVER, orthopnea  GI:       denies abd pain, nausea, vomit, diarrhea, constipation, GERD, melena, hematochezia  :     negative           Objective:      Visit Vitals  BP (!) 131/90 (BP 1 Location: Right lower arm, BP Patient Position: At rest)   Pulse 71   Temp 98.2 °F (36.8 °C)   Resp 20   Ht 5' 9\" (1.753 m)   Wt 81.6 kg (180 lb)   SpO2 99%   BMI 26.58 kg/m²       Physical Exam:  General: alert, no distress, and chronically ill appearing  Head: Normocephalic, without obvious abnormality, atraumatic  Eyes: PERRL, EOMI, anicteric sclerae, and conjuntiva clear  ENT: lips, mucosa, and tongue normal  Neck: normal, supple, and no tenderness  Lungs: rhonchi bilaterally  Heart: S1, S2 normal, regular rate, and regular rhythm  Abd: not distended, soft, nontender, BS present and normactive  Ext:  no cyanosis, bilateral lower extremity 2+ edema, and contracture of left leg.   Also has multiple toe indications on the right foot  Skin: normal skin color, no rashes, and texture normal  Neuro:  alert, oriented to self & somehat to situation only, no defects noted in general exam.  Psych: not anxious, cooperative, appropriate affect       Medications:  Current Facility-Administered Medications   Medication Dose Route Frequency    0.9% sodium chloride infusion  100 mL/hr IntraVENous CONTINUOUS    sodium chloride (NS) flush 5-40 mL  5-40 mL IntraVENous Q8H    sodium chloride (NS) flush 5-40 mL  5-40 mL IntraVENous PRN    polyethylene glycol (MIRALAX) packet 17 g  17 g Oral DAILY PRN    bisacodyL (DULCOLAX) suppository 10 mg  10 mg Rectal DAILY PRN    ondansetron (ZOFRAN) injection 4 mg  4 mg IntraVENous Q6H PRN    ascorbic acid (vitamin C) (VITAMIN C) tablet 500 mg  500 mg Oral BID    zinc sulfate (ZINCATE) 50 mg zinc (220 mg) capsule 1 Capsule  1 Capsule Oral DAILY    cholecalciferol (VITAMIN D3) (1000 Units /25 mcg) tablet 1,000 Units  1,000 Units Oral DAILY    melatonin tablet 3 mg  3 mg Oral QHS    sodium chloride (NS) flush 5-40 mL  5-40 mL IntraVENous Q8H    sodium chloride (NS) flush 5-40 mL  5-40 mL IntraVENous PRN    acetaminophen (TYLENOL) tablet 650 mg  650 mg Oral Q6H PRN    Or    acetaminophen (TYLENOL) suppository 650 mg  650 mg Rectal Q6H PRN    enoxaparin (LOVENOX) injection 30 mg  30 mg SubCUTAneous Q12H    guaiFENesin-dextromethorphan (ROBITUSSIN DM) 100-10 mg/5 mL syrup 5 mL  5 mL Oral Q4H PRN    dexamethasone (PF) (DECADRON) 10 mg/mL injection 6 mg  6 mg IntraVENous Q24H    guaiFENesin ER (MUCINEX) tablet 1,200 mg  1,200 mg Oral BID    arformoteroL (BROVANA) neb solution 15 mcg  15 mcg Nebulization BID RT    albuterol (PROVENTIL VENTOLIN) nebulizer solution 2.5 mg  2.5 mg Nebulization Q2H PRN    [Held by provider] albuterol-ipratropium (DUO-NEB) 2.5 MG-0.5 MG/3 ML  3 mL Nebulization Q6H RT    glucose chewable tablet 16 g  4 Tablet Oral PRN glucagon (GLUCAGEN) injection 1 mg  1 mg IntraMUSCular PRN    dextrose 10% infusion 0-250 mL  0-250 mL IntraVENous PRN    insulin lispro (HUMALOG) injection   SubCUTAneous Q6H    tiotropium-olodateroL (STIOLTO RESPIMAT) 2.5-2.5 mcg/actuation inhaler 2 Puff  2 Puff Inhalation DAILY        Labs:  Recent Labs     11/18/22  0932   WBC 8.4   HGB 14.0   HCT 48.8          Recent Labs     11/18/22  0932      K 5.3*      CO2 36*   *   BUN 23*   CREA 1.12   CA 8.8   MG 2.2   ALB 2.9*   TBILI 0.4   ALT 52             Radiology:  CT HEAD WO CONT    Result Date: 11/18/2022  No acute intracranial abnormality identified. CTA CHEST W OR W WO CONT    Result Date: 11/18/2022  1. No evidence of pulmonary arterial thromboembolism. 2. Small bilateral pleural effusions. There is bilateral lower lobe volume loss. 3. Acute-appearing, nondisplaced fracture of the RIGHT seventh rib. XR CHEST PORT    Result Date: 11/18/2022  Cardiomegaly. Low lung volumes with mild bibasilar atelectasis. I personally reviewed and interpreted the imaging studies and agree with official reading    The labs, imaging studies, and medications was reviewed by me on: November 19, 2022         Assessment/Plan:      Acute respiratory failure with hypoxia due to COVID-19 AND COPD requiring BiPAP on admission  Non-vaccinated    Continue decadron  Zinc, vitamin C, vitamin D, bronchodilators  Consider Baricitinib  Maintain O2 sats at least 92-94%, supplemental O2 per protocol, IVFs, supportive care, incentive spirometry  Monitor labs, follow inflammatory markers  Pulmonary consult     COVID-19 virus infection  Supportive care and treat as above  Consider Baricitinib     COPD with acute exacerbation due to COVID-19  For the most part treat as above with Decadron, will add inhaled corticosteroids and bronchodilators.   Monitor     Acute hypoxic encephalopathy with agitation POA  Improving but not resolved agitated feels that he is in [facility] and was swinging pillow at staff Per nursing  Will give low-dose Ativan as needed and then check sats  Continue treating hypoxia. Monitor     Elevated troponin and BNP POA  Slightly elevated and flat. No  CP. Elevated glucose hypoglycemia unsure about history of diabetes type 2 with neuropathy  Monitor blood glucose levels with insulin sliding scale coverage. No basal coverage for now. Monitor for complications. A1C. Hypertension  Per chart but not taking medications for it.      Hyperkalemia mild 5.3 POA  IV fluids and monitor    Body mass index is 26.58 kg/m².:  25.0 - 29.9:  Overweight    F:  ml/hr  E: Monitor  N: ADULT DIET Regular; 3 carb choices (45 gm/meal)       Risk of deterioration: high      Discussed:  Pt's condition, Imaging findings, Lab findings, Assessment, and Care Plan discussed with: Patient and RN    Prophylaxis:  Lovenox SQ    Anticipated discharge disposition:  LTC    HR DC Barriers: Currently not medically stable for discharge      Total time: -35- minutes **I personally saw and examined the patient during this time period**      Date of service:    11/19/2022                ___________________________________________________    Admitting Physician: Arnav Adame MD

## 2022-11-19 NOTE — PROGRESS NOTES
Problem: Risk for Spread of Infection  Goal: Prevent transmission of infectious organism to others  Description: Prevent the transmission of infectious organisms to other patients, staff members, and visitors.   Outcome: Progressing Towards Goal     Problem: Patient Education:  Go to Education Activity  Goal: Patient/Family Education  Outcome: Not Progressing Towards Goal

## 2022-11-19 NOTE — PROGRESS NOTES
PHYSICAL THERAPY EVALUATION/DISCHARGE  Patient: Raul Byrnes (89 y.o. male)  Date: 11/19/2022  Primary Diagnosis: COVID-19 virus infection [U07.1]  Acute respiratory failure with hypoxia (HCC) [J96.01]  AMS (altered mental status) [R41.82]       Precautions:          ASSESSMENT  Based on the objective data described below, the patient presents with confusion, hallucinating?, generalized weakness, decreased ROM in all extremities, non-functional LLE with contracture at hip and knee, cold to touch R foot with one/two? toes remaining and functional mobility near his perceived baseline following admission +COVID with hypoxia from his nursing home. Pt tangential in conversation requesting a match and transportation to [facility]. \" Attempted EOB though pt unable to achieve given minimal to no ability to flex at hips. Pt unable to report his baseline though anticipate it is bed bound given state of LEs. Total A x 2 for bed mobility. Set up in bed with tray and all needs in reach. Recommend d/c back to prior living situation. Pt is requiring 5L/min supplemental O2 and desaturates to 86% with quick recovery though sensor on cold foot. Functional Outcome Measure: The patient scored 0/28 on the Tinetti outcome measure which is indicative of high fall risk. Other factors to consider for discharge: baseline dependence     Further skilled acute physical therapy is not indicated at this time. PLAN :  Recommendation for discharge: (in order for the patient to meet his/her long term goals)  No skilled physical therapy/ follow up rehabilitation needs identified at this time. This discharge recommendation:  Has been made in collaboration with the attending provider and/or case management    IF patient discharges home will need the following DME: patient owns DME required for discharge       SUBJECTIVE:   Patient stated [place] are [place] police station.     OBJECTIVE DATA SUMMARY:   HISTORY:    Past Medical History: Diagnosis Date    Diabetes ([facility] [address]     Epilepsy ([facility] [address]     ETOH abuse     Hypertension      Past Surgical History:   Procedure Laterality Date    HX AMPUTATION      Right, 2 toes    HX KNEE REPLACEMENT      Right       Prior level of function: from nursing home, non-ambulatory  Personal factors and/or comorbidities impacting plan of care: diabetes, epilepsy, ETOH abuse, smoker, HTN    Home Situation  Home Environment: Long term care [facility])  One/Two Story Residence: One story    EXAMINATION/PRESENTATION/DECISION MAKING:   Critical Behavior:  Neurologic State: Alert, Confused  Orientation Level: Oriented to place, Disoriented to place, Disoriented to situation, Disoriented to time        Hearing:     Skin:    Edema:   Range Of Motion:  AROM: Grossly decreased, non-functional (LLE nonfunctional)                       Strength:    Strength: Generally decreased, functional                    Tone & Sensation:   Tone: Abnormal                              Coordination:  Coordination: Grossly decreased, non-functional  Vision:      Functional Mobility:  Bed Mobility:  Rolling: Total assistance;Assist x2  Supine to Sit: Total assistance;Assist x2  Sit to Supine: Total assistance;Assist x2     Transfers:                             Balance:   Sitting: Impaired (unable to achieve EOB)  Ambulation/Gait Training:                                                         Stairs:               Therapeutic Exercises:       Functional Measure:  Tinetti test:    Sitting Balance: 0  Arises: 0  Attempts to Rise: 0  Immediate Standing Balance: 0  Standing Balance: 0  Nudged: 0  Eyes Closed: 0  Turn 360 Degrees - Continuous/Discontinuous: 0  Turn 360 Degrees - Steady/Unsteady: 0  Sitting Down: 0  Balance Score: 0 Balance total score  Indication of Gait: 0  R Step Length/Height: 0  L Step Length/Height: 0  R Foot Clearance: 0  L Foot Clearance: 0  Step Symmetry: 0  Step Continuity: 0  Path: 0  Trunk: 0  Walking Time: 0  Gait Score: 0 Gait total score  Total Score: 0/28 Overall total score         Tinetti Tool Score Risk of Falls  <19 = High Fall Risk  19-24 = Moderate Fall Risk  25-28 = Low Fall Risk  Tinetti ME. Performance-Oriented Assessment of Mobility Problems in Elderly Patients. [address] 65; [id number]. (Scoring Description: PT Bulletin Feb. 10, 1993)    Older adults: Naz Paredes et al, 2009; n = 1000 [facility] elderly evaluated with ABC, ARVIN, ADL, and IADL)  · Mean ARVIN score for males aged 69-68 years = 26.21(3.40)  · Mean ARVIN score for females age 69-68 years = 25.16(4.30)  · Mean ARVIN score for males over 80 years = 23.29(6.02)  · Mean ARVIN score for females over 80 years = 17.20(8.32)            Physical Therapy Evaluation Charge Determination   History Examination Presentation Decision-Making   HIGH Complexity :3+ comorbidities / personal factors will impact the outcome/ POC  MEDIUM Complexity : 3 Standardized tests and measures addressing body structure, function, activity limitation and / or participation in recreation  LOW Complexity : Stable, uncomplicated  Other outcome measures Tinetti  LOW       Based on the above components, the patient evaluation is determined to be of the following complexity level: LOW     Pain Rating:  none    Activity Tolerance:   Fair and requires rest breaks      After treatment patient left in no apparent distress:   Supine in bed and Call bell within reach    COMMUNICATION/EDUCATION:   The patients plan of care was discussed with: Occupational therapist and Registered nurse. Fall prevention education was provided and the patient/caregiver indicated understanding., Patient/family have participated as able in goal setting and plan of care. , and Patient/family agree to work toward stated goals and plan of care.     Thank you for this referral.  Sruthi Wade, PT   Time Calculation: 24 mins

## 2022-11-19 NOTE — PROGRESS NOTES
TRANSFER - IN REPORT:    Verbal report received from ZOEY Lipscomb(name) on Amos Escudero  being received from ED(unit) for routine progression of care      Report consisted of patients Situation, Background, Assessment and   Recommendations(SBAR). Information from the following report(s) SBAR, Kardex, ED Summary, MAR, Recent Results, and Cardiac Rhythm SR  was reviewed with the receiving nurse. Opportunity for questions and clarification was provided. Assessment completed upon patients arrival to unit and care assumed. This patient was assisted with Intentional Toileting every 2 hours during this shift as appropriate. Documentation of ambulation and output reflected on Flowsheet as appropriate. Purposeful hourly rounding was completed using AIDET and 5Ps. Outcomes of PHR documented as they occurred. Bed alarm in use as appropriate. Dual Suction and ambubag in place. Bedside and Verbal shift change report given to ZOEY Crawford (oncoming nurse) by Dave Sommer (offgoing nurse).  Report included the following information SBAR, Kardex, ED Summary, MAR, Recent Results, and Cardiac Rhythm SR .

## 2022-11-19 NOTE — ROUTINE PROCESS
1900  Bedside shift change report given to [address] (oncoming nurse) by Trevor Warren,  RN (offgoing nurse). Report included the following information SBAR, Kardex, ED Summary, Intake/Output, and Recent Results. 1930  Day team primary and night team praimry rn attempted to turn patient to assess bottom. Patient threatening nurses and refusing to be turned. Patient educated and re oriented. Will try again later. Patient refusing lab draws. Changed patients pulse ox because patient had pulled other one off.     1945  Patient pulled off pulse ox again. Attempted t put on another one and patient refusing pulse ox monitoring. Will attempt again later. 2000  Patient NPO in orders but reg diet in hosp note. Patient received and ate a tray for dinner. Reached out to Dr. Radha Diaz about changing to reg diet. Order received    2010  Patient is Q6 blood sugars for NPO status but is now eating reg diet. Reached out to Dr. Radha Diaz about switching patient to ACHS. Order received     2200  Patient last ABG was 1130 and co2 was 76. Resp therapist spoke with patient about bi-pap and patient refused. Resp therapist came and spoke to primary RN to reach out to doctor to see if we can get something to relax him enough to tolerate the bi-pap to improve co2. Reached out to Dr. Radha Diaz. No new orders. 0040  Patient refusing Lovenox injection. Swinging hands telling primary RN to get away. 2299  Patient found without o2 on. Patient refusing to wear it. Patient also pulled of o2 monitoring. Patient agreed to wear it after education but still refused o2 monitoring. Will continue to re orient patient. 2687  Patient pulled leads off and condom cath off. Reapplied condom cath. Patient has also pulled off o2. Patient allowed primary RN to put o2 on. Primary RN called respiratory to get a breathing treatment because patient agreed to do one. Patient did inhaler. Will continue to monitor patient.      0069  Patient was found with o2 on his forehead . Still refusing o2 monitoring after education and reorientation. Patient still refusing bi-pap. Patient has pulled off condom cath off again an has wet the bed. Patient swatting at primary rn and refusing to be cleaned up. Patient also refusing blood draw for morning labs. 0500  Patient allowed primary RN and tech to clean patient. Condom cath reapplied and education provided to patient on safety. Needs reinforcement. 3672  Patient cooperative this morning and was able to obtain new access and obtain some labs. 0700  Bedside shift change report given to [address] [place] [address] Se,   RN (oncoming nurse) by Lg Huizar (offgoing nurse). Report included the following information SBAR, Kardex, ED Summary, Intake/Output, and Recent Results. This patient was assisted with Intentional Toileting every 2 hours during this shift as appropriate. Documentation of ambulation and output reflected on Flowsheet as appropriate. Purposeful hourly rounding was completed using AIDET and 5Ps. Outcomes of PHR documented as they occurred. Bed alarm in use as appropriate. Dual Suction and ambubag in place.

## 2022-11-19 NOTE — PROGRESS NOTES
OCCUPATIONAL THERAPY EVALUATION/DISCHARGE  Patient: Pricila Terry (73 y.o. male)  Date: 11/19/2022  Primary Diagnosis: COVID-19 virus infection [U07.1]  Acute respiratory failure with hypoxia (HCC) [J96.01]  AMS (altered mental status) [R41.82]       Precautions: Skin, fall       ASSESSMENT  Based on the objective data described below, the patient presents with impaired cognition with confusion, ROM in BUE/BLE, generalized strength, mobility, join contractures impacting his ability to complete ADL tasks. He is admitted from [facility] where he reports he lived after his mother passed away. Per chart was on last day of COVID isolation and found to have hypoxia. Nursing cleared for therapy. Received in bed LLE knee and hip non functional contracture(incision scar noted on hip), multiple toe amputations on R foot, R foot cold to touch decreased sensation, L UE impaired ROM, coordination and strength and incision scar noted on R shoulder. Oriented to self only, reporting to be at police station and asking for a match. Overall fair command following. Questionable historian as he reports having assist to get to wc however unable to achieve hip flexion necessary to sit. Supine > sit with total A x2 and unable to achieve sitting. Washed face with BUE with set up and supervision and denies any difficulty with self feeding. Received and left on 5L, SpO2 to 86% sensor on R toe with attempt to sit EOB, increased >90% within 1 min. Current Level of Function (ADLs/self-care): feeding set up, washing face set up    Functional Outcome Measure: The patient scored 5/100 on the Barthel Index outcome measure which is indicative of severe impairment with ADL tasks. Other factors to consider for discharge: Patient appears to be at baseline, living at [address] [place] [address] with assistance from staff and presumably bed bound.       PLAN :  Recommend with staff: repositioning every two hours, encourage participation in ADL tasks as able    Recommendation for discharge: (in order for the patient to meet his/her long term goals)  No skilled occupational therapy/ follow up rehabilitation needs identified at this time. This discharge recommendation:  Has not yet been discussed the attending provider and/or case management    IF patient discharges home will need the following DME: hospital bed       SUBJECTIVE:   Patient stated I need a match.     OBJECTIVE DATA SUMMARY:   HISTORY:   Past Medical History:   Diagnosis Date    Diabetes ([facility]     Epilepsy ([facility]     ETOH abuse     Hypertension      Past Surgical History:   Procedure Laterality Date    HX AMPUTATION      Right, 2 toes    HX KNEE REPLACEMENT      Right       Prior Level of Function/Environment/Context: LTC [facility] or extensive additional review of patient history:   Home Situation  Home Environment: Long term care [facility])  One/Two Story Residence: One story    Hand dominance: Right    EXAMINATION OF PERFORMANCE DEFICITS:  Cognitive/Behavioral Status:  Neurologic State: Alert;Confused  Orientation Level: Oriented to person;Disoriented to time;Disoriented to situation;Disoriented to place               Range of Motion:  AROM: Grossly decreased, non-functional (LLE nonfunctional)                         Strength:  Strength: Generally decreased, functional                Coordination:  Coordination: Grossly decreased, non-functional  Fine Motor Skills-Upper: Right Intact; Left Impaired    Gross Motor Skills-Upper: Right Intact; Left Impaired    Tone & Sensation:  Tone: Abnormal   Sensation: abnormal         Balance:  Sitting: Impaired (unable to achieve EOB)    Functional Mobility and Transfers for ADLs:  Bed Mobility:  Rolling: Total assistance;Assist x2  Supine to Sit: Total assistance;Assist x2  Sit to Supine: Total assistance;Assist x2  Scooting:  Total assistance;Assist x2    Transfers:       ADL Assessment:  Feeding: Setup  Oral Facial Hygiene/Grooming: Setup;Supervision  Bathing: Maximum assistance  Type of Bath: Patient refused (patient reports)  Upper Body Dressing: Maximum assistance  Lower Body Dressing: Total assistance  Toileting: Total assistance         ADL Intervention and task modifications:     Grooming  Washing Face: Set-up; Supervision    Type of Bath: Patient refused (patient reports) got one last night     Functional Measure:    Barthel Index:  Bathin  Bladder: 0  Bowels: 0  Groomin  Dressin  Feedin  Mobility: 0  Stairs: 0  Toilet Use: 0  Transfer (Bed to Chair and Back): 0  Total: 5/100      The Barthel ADL Index: Guidelines  1. The index should be used as a record of what a patient does, not as a record of what a patient could do. 2. The main aim is to establish degree of independence from any help, physical or verbal, however minor and for whatever reason. 3. The need for supervision renders the patient not independent. 4. A patient's performance should be established using the best available evidence. Asking the patient, friends/relatives and nurses are the usual sources, but direct observation and common sense are also important. However direct testing is not needed. 5. Usually the patient's performance over the preceding 24-48 hours is important, but occasionally longer periods will be relevant. 6. Middle categories imply that the patient supplies over 50 per cent of the effort. 7. Use of aids to be independent is allowed. Score Interpretation (from [address] [place] [address])    Independent   60-79 Minimally independent   40-59 Partially dependent   20-39 Very dependent   <20 Totally dependent     -Hina Turk., Barthel, D.W. (1965). Functional evaluation: the Barthel Index. [facility] ([address] (1997). The Barthel activities of daily living index: self-reporting versus actual performance in the old (> or = 75 years).  Journal of [address] [place] [address] 45(7), [facility], JMATTIEF, Belgica Devi., Sj Herzog., Gustabo Quiñones (1999). Measuring the change in disability after inpatient rehabilitation; comparison of the responsiveness of the Barthel Index and Functional [place] Measure. Journal of Neurology, Neurosurgery, and Psychiatry, 66(4), 511-215. ALLY Granda, GENET Contreras, & Harjeet Turner MBIBI (2004) Assessment of post-stroke quality of life in cost-effectiveness studies: The usefulness of the Barthel Index and the EuroQoL-5D. Quality of Life Research, 15, 135-94         Occupational Therapy Evaluation Charge Determination   History Examination Decision-Making   MEDIUM Complexity : Expanded review of history including physical, cognitive and psychosocial  history  MEDIUM Complexity : 3-5 performance deficits relating to physical, cognitive , or psychosocial skils that result in activity limitations and / or participation restrictions MEDIUM Complexity : Patient may present with comorbidities that affect occupational performnce. Miniml to moderate modification of tasks or assistance (eg, physical or verbal ) with assesment(s) is necessary to enable patient to complete evaluation       Based on the above components, the patient evaluation is determined to be of the following complexity level: MEDIUM  Pain Rating:  No c/o pain    Activity Tolerance:   Fair    After treatment patient left in no apparent distress:    Call bell within reach, attempted to place on Bed alarm activated, and Side rails x 3    COMMUNICATION/EDUCATION:   The patients plan of care was discussed with: Physical therapist and Registered nurse.      Thank you for this referral.  Rancho Young OT  Time Calculation: 22 mins

## 2022-11-20 LAB
COMMENT, HOLDF: NORMAL
CRP SERPL-MCNC: 0.61 MG/DL (ref 0–0.6)
GLUCOSE BLD STRIP.AUTO-MCNC: 118 MG/DL (ref 65–117)
GLUCOSE BLD STRIP.AUTO-MCNC: 80 MG/DL (ref 65–117)
GLUCOSE BLD STRIP.AUTO-MCNC: 90 MG/DL (ref 65–117)
GLUCOSE BLD STRIP.AUTO-MCNC: 96 MG/DL (ref 65–117)
SAMPLES BEING HELD,HOLD: NORMAL
SERVICE CMNT-IMP: ABNORMAL
SERVICE CMNT-IMP: NORMAL

## 2022-11-20 PROCEDURE — 86140 C-REACTIVE PROTEIN: CPT

## 2022-11-20 PROCEDURE — 94640 AIRWAY INHALATION TREATMENT: CPT

## 2022-11-20 PROCEDURE — 74011000250 HC RX REV CODE- 250: Performed by: HOSPITALIST

## 2022-11-20 PROCEDURE — 74011250637 HC RX REV CODE- 250/637: Performed by: HOSPITALIST

## 2022-11-20 PROCEDURE — 65270000046 HC RM TELEMETRY

## 2022-11-20 PROCEDURE — 77010033678 HC OXYGEN DAILY

## 2022-11-20 PROCEDURE — 84484 ASSAY OF TROPONIN QUANT: CPT

## 2022-11-20 PROCEDURE — 82962 GLUCOSE BLOOD TEST: CPT

## 2022-11-20 PROCEDURE — 94761 N-INVAS EAR/PLS OXIMETRY MLT: CPT

## 2022-11-20 PROCEDURE — 36415 COLL VENOUS BLD VENIPUNCTURE: CPT

## 2022-11-20 PROCEDURE — 74011250636 HC RX REV CODE- 250/636: Performed by: HOSPITALIST

## 2022-11-20 RX ORDER — DEXAMETHASONE 4 MG/1
4 TABLET ORAL EVERY 12 HOURS
Status: DISCONTINUED | OUTPATIENT
Start: 2022-11-20 | End: 2022-11-21 | Stop reason: HOSPADM

## 2022-11-20 RX ORDER — ARFORMOTEROL TARTRATE 15 UG/2ML
15 SOLUTION RESPIRATORY (INHALATION)
Status: DISCONTINUED | OUTPATIENT
Start: 2022-11-20 | End: 2022-11-21 | Stop reason: HOSPADM

## 2022-11-20 RX ADMIN — SODIUM CHLORIDE, PRESERVATIVE FREE 10 ML: 5 INJECTION INTRAVENOUS at 13:14

## 2022-11-20 RX ADMIN — SODIUM CHLORIDE, PRESERVATIVE FREE 10 ML: 5 INJECTION INTRAVENOUS at 21:18

## 2022-11-20 RX ADMIN — DEXAMETHASONE 4 MG: 4 TABLET ORAL at 21:11

## 2022-11-20 RX ADMIN — SODIUM CHLORIDE 100 ML/HR: 9 INJECTION, SOLUTION INTRAVENOUS at 17:34

## 2022-11-20 RX ADMIN — SALMETEROL XINAFOATE 1 PUFF: 50 POWDER, METERED ORAL; RESPIRATORY (INHALATION) at 21:43

## 2022-11-20 RX ADMIN — ZINC SULFATE 220 MG (50 MG) CAPSULE 1 CAPSULE: CAPSULE at 08:48

## 2022-11-20 RX ADMIN — ENOXAPARIN SODIUM 30 MG: 100 INJECTION SUBCUTANEOUS at 00:18

## 2022-11-20 RX ADMIN — OXYCODONE HYDROCHLORIDE AND ACETAMINOPHEN 500 MG: 500 TABLET ORAL at 08:48

## 2022-11-20 RX ADMIN — DEXAMETHASONE 4 MG: 4 TABLET ORAL at 11:01

## 2022-11-20 RX ADMIN — SODIUM CHLORIDE, PRESERVATIVE FREE 10 ML: 5 INJECTION INTRAVENOUS at 21:17

## 2022-11-20 RX ADMIN — TIOTROPIUM BROMIDE AND OLODATEROL 2 PUFF: 3.124; 2.736 SPRAY, METERED RESPIRATORY (INHALATION) at 23:13

## 2022-11-20 RX ADMIN — TIOTROPIUM BROMIDE AND OLODATEROL 2 PUFF: 3.124; 2.736 SPRAY, METERED RESPIRATORY (INHALATION) at 10:43

## 2022-11-20 RX ADMIN — SODIUM CHLORIDE 100 ML/HR: 9 INJECTION, SOLUTION INTRAVENOUS at 04:51

## 2022-11-20 RX ADMIN — Medication 3 MG: at 21:11

## 2022-11-20 RX ADMIN — Medication 1000 UNITS: at 08:48

## 2022-11-20 RX ADMIN — GUAIFENESIN 1200 MG: 600 TABLET ORAL at 21:15

## 2022-11-20 RX ADMIN — GUAIFENESIN 1200 MG: 600 TABLET ORAL at 08:48

## 2022-11-20 RX ADMIN — ENOXAPARIN SODIUM 30 MG: 100 INJECTION SUBCUTANEOUS at 13:10

## 2022-11-20 RX ADMIN — OXYCODONE HYDROCHLORIDE AND ACETAMINOPHEN 500 MG: 500 TABLET ORAL at 17:37

## 2022-11-20 NOTE — PROGRESS NOTES
Problem: Risk for Spread of Infection  Goal: Prevent transmission of infectious organism to others  Description: Prevent the transmission of infectious organisms to other patients, staff members, and visitors. Outcome: Progressing Towards Goal     Problem: Airway Clearance - Ineffective  Goal: Achieve or maintain patent airway  Outcome: Progressing Towards Goal     Problem: Pressure Injury - Risk of  Goal: *Prevention of pressure injury  Description: Document Adam Scale and appropriate interventions in the flowsheet. Outcome: Progressing Towards Goal  Note: Pressure Injury Interventions:  Sensory Interventions: Assess changes in LOC, Turn and reposition approx. every two hours (pillows and wedges if needed)    Moisture Interventions: Check for incontinence Q2 hours and as needed    Activity Interventions: PT/OT evaluation    Mobility Interventions: Turn and reposition approx. every two hours(pillow and wedges)    Nutrition Interventions: Document food/fluid/supplement intake, Offer support with meals,snacks and hydration    Friction and Shear Interventions: Transferring/repositioning devices                Problem: Falls - Risk of  Goal: *Absence of Falls  Description: Document Heaven Fall Risk and appropriate interventions in the flowsheet.   Outcome: Progressing Towards Goal  Note: Fall Risk Interventions:       Mentation Interventions: Bed/chair exit alarm    Medication Interventions: Bed/chair exit alarm    Elimination Interventions: Bed/chair exit alarm, Call light in reach, Patient to call for help with toileting needs, Stay With Me (per policy)

## 2022-11-20 NOTE — ROUTINE PROCESS
1900  Bedside shift change report given to Yonatan Peraza (oncoming nurse) by Madison Thomason RN (offgoing nurse). Report included the following information SBAR, Kardex, ED Summary, Intake/Output, and Recent Results. 2200  Patient 02 saturation dipping into the 80s. Primary RN went to assess patient and nsal cannula was found on patients forehead. Re-oriented patient and patient allowed o2 to be reapplied. 0130  Patient saturations dipping into the 80s again. Primary Rn went to assess patient and patients nasal cannula was not on. Put back on and education provided to patient. 8461  Patient heart rate periodically dipping down into the upper to mid 40s. Patient vitals are stable other wise and is asymptomatic. Dr. Wero Hernadez notified. No new orders         0700  Bedside shift change report given to Madison Thomason RN (oncoming nurse) by Yonatan Peraza (offgoing nurse). Report included the following information SBAR, Kardex, ED Summary, Intake/Output, and Recent Results. This patient was assisted with Intentional Toileting every 2 hours during this shift as appropriate. Documentation of ambulation and output reflected on Flowsheet as appropriate. Purposeful hourly rounding was completed using AIDET and 5Ps. Outcomes of PHR documented as they occurred. Bed alarm in use as appropriate. Dual Suction and ambubag in place.

## 2022-11-20 NOTE — PROGRESS NOTES
[address]  [phone]         Hospitalist Progress Note        NAME:  Elza León   :  1960   MRN:  [id number]    Date/Time:  2022     Patient PCP:  None    Emergency Contact:    Extended Emergency Contact Information  Primary Emergency Contact: None,Given   [place]  Relation: None      Code: Full Code     Isolation Precautions: Droplet Plus, Contact, Droplet, Enteric Contact, Droplet Plus        Subjective:     REASON FOR VISIT:  Recheck AHRF / COVID-19     HPI & INTERVAL HISTORY:     Mr. Rica Thompson is a 60 y.o. male with history that includes COPD presents with AMS due to AHRF requiring BiPAP due to COV-19.     : Patient resting comfortably. No acute events overnight and no new complaints. Patient appears to be more oriented today. He is aware that he was confused yesterday but cannot remember what happened. Very minimal shortness of breath if any. Still on oxygen    : Patient seen and examined. Has improved and is off BiPAP. He remains somewhat disoriented though I don't know his baseline. Unaware of location and date.       ALLERGIES  No Known Allergies      ROS:  Gen:   malaise  Eyes:  negative  ENT:    negative  Resp:  cough and shortness of breath  CVS:   negative  GI:       negative  :     negative           Objective:      Visit Vitals  /82 (BP 1 Location: Left upper arm, BP Patient Position: At rest)   Pulse 62   Temp 97.4 °F (36.3 °C)   Resp 20   Ht 5' 9\" (1.753 m)   Wt 81.6 kg (180 lb)   SpO2 100%   BMI 26.58 kg/m²       Physical Exam:  General: Not in distress  Head: Normocephalic, without obvious abnormality, atraumatic  Eyes: anicteric sclerae and conjuntiva clear  ENT: lips, mucosa, and tongue normal  Neck: normal, supple, and no tenderness  Lungs: clear to auscultation and normal respiratory effort   Heart: S1, S2 normal, regular rate, and regular rhythm  Abd: not distended, soft, nontender, BS present and normactive  Ext:  no cyanosis, bilateral lower extremity 2+ edema, and contracture of left leg.   Also has multiple toe indications on the right foot appears to only have third or fourth toe remaining    Skin: normal skin color, no rashes, and texture normal  Neuro:  alert, oriented to self & somehat to situation only, no defects noted in general exam.  Psych: not anxious, cooperative, appropriate affect       Medications:  Current Facility-Administered Medications   Medication Dose Route Frequency    LORazepam (ATIVAN) 2 mg/mL injection 0.5 mg  0.5 mg IntraVENous Q4H PRN    0.9% sodium chloride infusion  100 mL/hr IntraVENous CONTINUOUS    sodium chloride (NS) flush 5-40 mL  5-40 mL IntraVENous Q8H    sodium chloride (NS) flush 5-40 mL  5-40 mL IntraVENous PRN    polyethylene glycol (MIRALAX) packet 17 g  17 g Oral DAILY PRN    bisacodyL (DULCOLAX) suppository 10 mg  10 mg Rectal DAILY PRN    ondansetron (ZOFRAN) injection 4 mg  4 mg IntraVENous Q6H PRN    ascorbic acid (vitamin C) (VITAMIN C) tablet 500 mg  500 mg Oral BID    zinc sulfate (ZINCATE) 50 mg zinc (220 mg) capsule 1 Capsule  1 Capsule Oral DAILY    cholecalciferol (VITAMIN D3) (1000 Units /25 mcg) tablet 1,000 Units  1,000 Units Oral DAILY    melatonin tablet 3 mg  3 mg Oral QHS    sodium chloride (NS) flush 5-40 mL  5-40 mL IntraVENous Q8H    sodium chloride (NS) flush 5-40 mL  5-40 mL IntraVENous PRN    acetaminophen (TYLENOL) tablet 650 mg  650 mg Oral Q6H PRN    Or    acetaminophen (TYLENOL) suppository 650 mg  650 mg Rectal Q6H PRN    enoxaparin (LOVENOX) injection 30 mg  30 mg SubCUTAneous Q12H    guaiFENesin-dextromethorphan (ROBITUSSIN DM) 100-10 mg/5 mL syrup 5 mL  5 mL Oral Q4H PRN    dexamethasone (PF) (DECADRON) 10 mg/mL injection 6 mg  6 mg IntraVENous Q24H    guaiFENesin ER (MUCINEX) tablet 1,200 mg  1,200 mg Oral BID    arformoteroL (BROVANA) neb solution 15 mcg  15 mcg Nebulization BID RT albuterol (PROVENTIL VENTOLIN) nebulizer solution 2.5 mg  2.5 mg Nebulization Q2H PRN    [Held by provider] albuterol-ipratropium (DUO-NEB) 2.5 MG-0.5 MG/3 ML  3 mL Nebulization Q6H RT    glucose chewable tablet 16 g  4 Tablet Oral PRN    glucagon (GLUCAGEN) injection 1 mg  1 mg IntraMUSCular PRN    dextrose 10% infusion 0-250 mL  0-250 mL IntraVENous PRN    insulin lispro (HUMALOG) injection   SubCUTAneous Q6H    tiotropium-olodateroL (STIOLTO RESPIMAT) 2.5-2.5 mcg/actuation inhaler 2 Puff  2 Puff Inhalation DAILY        Labs:  Recent Labs     11/19/22  0709   WBC 9.3   HGB 12.6   HCT 42.4          Recent Labs     11/19/22  0709      K 4.2   CL 99   CO2 38*   GLU 93   BUN 21*   CREA 0.85   CA 8.8   MG 2.1   ALB 3.1*   TBILI 0.4   ALT 44             Radiology:  No results found. The labs, imaging studies, and medications was reviewed by me on: November 20, 2022         Assessment/Plan:      Acute respiratory failure with hypoxia due to COVID-19 AND COPD requiring BiPAP on admission  Improved  Non-vaccinated    Continue decadron, Zinc, vitamin C, vitamin D, bronchodilators  Consider Baricitinib if worsens  Maintain O2 sats at least 92-94%, supplemental O2 per protocol, IVFs, supportive care, incentive spirometry  Monitor labs, follow inflammatory markers  Pulmonary consult     COVID-19 virus infection  Supportive care and treat as above  Consider Baricitinib     COPD with acute exacerbation due to COVID-19  Improving  For the most part treat as above with Decadron switching to p.o. from IV for total of 10 days, inhaled corticosteroids and bronchodilators. Monitor. Acute hypoxic encephalopathy with agitation POA  Improving but not resolved agitated feels that he is in assisted and was swinging pillow at staff Per nursing on 11/19/2022  Will give low-dose Ativan as needed and then check sats  Continue treating hypoxia. Monitor     Elevated troponin and BNP POA  Slightly elevated and flat.  No CP.     Elevated glucose hypoglycemia unsure about history of diabetes type 2 with neuropathy  Monitor blood glucose levels with insulin sliding scale coverage. No basal coverage for now. Monitor for complications. A1C. Hypertension  Per chart but not taking medications for it.      Hyperkalemia mild 5.3 POA  IV fluids and monitor    Body mass index is 26.58 kg/m².:  25.0 - 29.9:  Overweight    F:  ml/hr  E: Monitor  N: ADULT DIET Regular; 3 carb choices (45 gm/meal)       Risk of deterioration: high      Discussed:  Pt's condition, Imaging findings, Lab findings, Assessment, and Care Plan discussed with: Patient and RN    Prophylaxis:  Lovenox SQ    Anticipated discharge disposition:  LTC possibly tomorrow the day after    HR DC Barriers: Currently not medically stable for discharge      Total time: -35- minutes **I personally saw and examined the patient during this time period**      Date of service:    11/20/2022                ___________________________________________________    Admitting Physician: Ivelisse Chavarria MD

## 2022-11-20 NOTE — PROGRESS NOTES
Bedside and Verbal shift change report given to ZOEY White (oncoming nurse) by Gates Crigler (offgoing nurse). Report included the following information SBAR, Kardex, ED Summary, MAR, Recent Results, and Cardiac Rhythm SR/SB . This patient was assisted with Intentional Toileting every 2 hours during this shift as appropriate. Documentation of ambulation and output reflected on Flowsheet as appropriate. Purposeful hourly rounding was completed using AIDET and 5Ps. Outcomes of PHR documented as they occurred. Bed alarm in use as appropriate. Dual Suction and ambubag in place    Bedside and Verbal shift change report given to ZOEY Presley/ ZOEY Jeffery (oncoming nurse) by Munir Benson (offgoing nurse). Report included the following information SBAR, Kardex, ED Summary, MAR, Recent Results, and Cardiac Rhythm SR/SB .

## 2022-11-20 NOTE — WOUND CARE
Wound Nurse Note    Initial consult received to see patient regarding skin assessment; risk for skin breakdown. Chart reviewed; spoke with nursing. Patient reportedly has been intermittently agitated, attempting to hit nursing staff. Assessment:  Gently approached patient; currently resting on a [employer] bed. Alert and oriented but asking me to throw his blanket in the trash. Agreed to a brief skin assessment of legs; declined an assessment of sacrum. Left leg - contracted, multiple dried linear scratches; heels intact. Unable to evaluate tight space behind knee for skin changes. Right leg- one toe remaining with pulse ox, scarring down leg and dry scaly skin. Heel appeared intact. Sacrum - nursing reports assessing area with incontinence care; appeared intact at that time. Recommendations:  Moisturizer daily to dry skin. Frequent incontinence care using barrier wipes + zinc cream.  Add air blower to current bed. Plan: Will follow; reconsult if further assistance is needed. Nursing reports good understanding.     Moises Elizondo RN [facility], [facility].  [facility] Wound Dept  [phone]    Left leg        Right leg

## 2022-11-20 NOTE — PROGRESS NOTES
RRT Note- S/W primary nurse regarding pt mews 3 DI 69 which improved to 2/57. Pt refusing some care and bipap. Per nurse md is aware and no further interventions at this time.

## 2022-11-21 VITALS
HEIGHT: 69 IN | BODY MASS INDEX: 26.66 KG/M2 | HEART RATE: 55 BPM | DIASTOLIC BLOOD PRESSURE: 85 MMHG | TEMPERATURE: 97.4 F | WEIGHT: 180 LBS | SYSTOLIC BLOOD PRESSURE: 105 MMHG | OXYGEN SATURATION: 96 % | RESPIRATION RATE: 20 BRPM

## 2022-11-21 LAB
COVID-19 RAPID TEST, COVR: NOT DETECTED
GLUCOSE BLD STRIP.AUTO-MCNC: 105 MG/DL (ref 65–117)
GLUCOSE BLD STRIP.AUTO-MCNC: 109 MG/DL (ref 65–117)
PROCALCITONIN SERPL-MCNC: <0.05 NG/ML
SERVICE CMNT-IMP: NORMAL
SERVICE CMNT-IMP: NORMAL
SOURCE, COVRS: NORMAL

## 2022-11-21 PROCEDURE — 87635 SARS-COV-2 COVID-19 AMP PRB: CPT

## 2022-11-21 PROCEDURE — 77010033678 HC OXYGEN DAILY

## 2022-11-21 PROCEDURE — 74011250636 HC RX REV CODE- 250/636: Performed by: HOSPITALIST

## 2022-11-21 PROCEDURE — 82962 GLUCOSE BLOOD TEST: CPT

## 2022-11-21 PROCEDURE — 94640 AIRWAY INHALATION TREATMENT: CPT

## 2022-11-21 PROCEDURE — 94761 N-INVAS EAR/PLS OXIMETRY MLT: CPT

## 2022-11-21 PROCEDURE — 74011250637 HC RX REV CODE- 250/637: Performed by: HOSPITALIST

## 2022-11-21 PROCEDURE — 74011000250 HC RX REV CODE- 250: Performed by: HOSPITALIST

## 2022-11-21 PROCEDURE — 36415 COLL VENOUS BLD VENIPUNCTURE: CPT

## 2022-11-21 PROCEDURE — 84145 PROCALCITONIN (PCT): CPT

## 2022-11-21 RX ORDER — PETROLATUM 42 G/100G
OINTMENT TOPICAL 2 TIMES DAILY
COMMUNITY

## 2022-11-21 RX ORDER — QUETIAPINE FUMARATE 100 MG/1
100 TABLET, FILM COATED ORAL 2 TIMES DAILY
COMMUNITY

## 2022-11-21 RX ORDER — ALBUTEROL SULFATE 90 UG/1
2 AEROSOL, METERED RESPIRATORY (INHALATION)
COMMUNITY

## 2022-11-21 RX ORDER — ACETAMINOPHEN 500 MG
500 TABLET ORAL
COMMUNITY

## 2022-11-21 RX ORDER — LORAZEPAM 0.5 MG/1
0.5 TABLET ORAL 2 TIMES DAILY
COMMUNITY

## 2022-11-21 RX ORDER — SERTRALINE HYDROCHLORIDE 25 MG/1
25 TABLET, FILM COATED ORAL DAILY
COMMUNITY

## 2022-11-21 RX ORDER — SENNOSIDES 8.6 MG/1
1 TABLET ORAL
COMMUNITY

## 2022-11-21 RX ORDER — GABAPENTIN 100 MG/1
100 CAPSULE ORAL 2 TIMES DAILY
COMMUNITY

## 2022-11-21 RX ORDER — LANOLIN ALCOHOL/MO/W.PET/CERES
325 CREAM (GRAM) TOPICAL
COMMUNITY

## 2022-11-21 RX ORDER — DEXAMETHASONE 4 MG/1
4 TABLET ORAL
Qty: 7 TABLET | Refills: 0 | Status: SHIPPED | OUTPATIENT
Start: 2022-11-22 | End: 2022-11-29

## 2022-11-21 RX ORDER — ASPIRIN 325 MG
50000 TABLET, DELAYED RELEASE (ENTERIC COATED) ORAL
COMMUNITY

## 2022-11-21 RX ORDER — SAME BUTANEDISULFONATE/BETAINE 400-600 MG
250 POWDER IN PACKET (EA) ORAL 2 TIMES DAILY
COMMUNITY

## 2022-11-21 RX ORDER — ALBUTEROL SULFATE 90 UG/1
2 AEROSOL, METERED RESPIRATORY (INHALATION)
Status: DISCONTINUED | OUTPATIENT
Start: 2022-11-21 | End: 2022-11-21 | Stop reason: HOSPADM

## 2022-11-21 RX ORDER — BISMUTH SUBSALICYLATE 262 MG
1 TABLET,CHEWABLE ORAL DAILY
COMMUNITY

## 2022-11-21 RX ORDER — TRAZODONE HYDROCHLORIDE 100 MG/1
100 TABLET ORAL
COMMUNITY

## 2022-11-21 RX ORDER — TAMSULOSIN HYDROCHLORIDE 0.4 MG/1
0.4 CAPSULE ORAL
COMMUNITY

## 2022-11-21 RX ORDER — QUETIAPINE FUMARATE 100 MG/1
50 TABLET, FILM COATED ORAL
COMMUNITY

## 2022-11-21 RX ORDER — DOCUSATE SODIUM 100 MG/1
100 CAPSULE, LIQUID FILLED ORAL 2 TIMES DAILY
COMMUNITY

## 2022-11-21 RX ORDER — ALBUTEROL SULFATE 0.83 MG/ML
2.5 SOLUTION RESPIRATORY (INHALATION)
Status: DISCONTINUED | OUTPATIENT
Start: 2022-11-21 | End: 2022-11-21 | Stop reason: HOSPADM

## 2022-11-21 RX ORDER — LANOLIN ALCOHOL/MO/W.PET/CERES
100 CREAM (GRAM) TOPICAL DAILY
COMMUNITY

## 2022-11-21 RX ORDER — ASPIRIN 81 MG/1
81 TABLET ORAL DAILY
COMMUNITY

## 2022-11-21 RX ORDER — ASCORBIC ACID 500 MG
500 TABLET ORAL 2 TIMES DAILY
COMMUNITY

## 2022-11-21 RX ORDER — FOLIC ACID 1 MG/1
1 TABLET ORAL DAILY
COMMUNITY

## 2022-11-21 RX ORDER — BUDESONIDE AND FORMOTEROL FUMARATE DIHYDRATE 160; 4.5 UG/1; UG/1
1 AEROSOL RESPIRATORY (INHALATION) 2 TIMES DAILY
COMMUNITY

## 2022-11-21 RX ADMIN — OXYCODONE HYDROCHLORIDE AND ACETAMINOPHEN 500 MG: 500 TABLET ORAL at 17:55

## 2022-11-21 RX ADMIN — OXYCODONE HYDROCHLORIDE AND ACETAMINOPHEN 500 MG: 500 TABLET ORAL at 08:15

## 2022-11-21 RX ADMIN — GUAIFENESIN 1200 MG: 600 TABLET ORAL at 08:15

## 2022-11-21 RX ADMIN — ENOXAPARIN SODIUM 30 MG: 100 INJECTION SUBCUTANEOUS at 00:49

## 2022-11-21 RX ADMIN — SALMETEROL XINAFOATE 1 PUFF: 50 POWDER, METERED ORAL; RESPIRATORY (INHALATION) at 09:55

## 2022-11-21 RX ADMIN — ZINC SULFATE 220 MG (50 MG) CAPSULE 1 CAPSULE: CAPSULE at 08:15

## 2022-11-21 RX ADMIN — SODIUM CHLORIDE 100 ML/HR: 9 INJECTION, SOLUTION INTRAVENOUS at 03:52

## 2022-11-21 RX ADMIN — ENOXAPARIN SODIUM 30 MG: 100 INJECTION SUBCUTANEOUS at 12:15

## 2022-11-21 RX ADMIN — Medication 1000 UNITS: at 08:15

## 2022-11-21 RX ADMIN — SODIUM CHLORIDE, PRESERVATIVE FREE 10 ML: 5 INJECTION INTRAVENOUS at 05:34

## 2022-11-21 RX ADMIN — DEXAMETHASONE 4 MG: 4 TABLET ORAL at 08:15

## 2022-11-21 RX ADMIN — LORAZEPAM 0.5 MG: 2 INJECTION, SOLUTION INTRAMUSCULAR; INTRAVENOUS at 12:44

## 2022-11-21 RX ADMIN — ALBUTEROL SULFATE 2 PUFF: 90 AEROSOL, METERED RESPIRATORY (INHALATION) at 13:04

## 2022-11-21 RX ADMIN — SODIUM CHLORIDE, PRESERVATIVE FREE 10 ML: 5 INJECTION INTRAVENOUS at 12:06

## 2022-11-21 NOTE — PROGRESS NOTES
Problem: Risk for Spread of Infection  Goal: Prevent transmission of infectious organism to others  Description: Prevent the transmission of infectious organisms to other patients, staff members, and visitors. Outcome: Progressing Towards Goal     Problem: Patient Education:  Go to Education Activity  Goal: Patient/Family Education  Outcome: Progressing Towards Goal     Problem: Airway Clearance - Ineffective  Goal: Achieve or maintain patent airway  Outcome: Progressing Towards Goal     Problem: Gas Exchange - Impaired  Goal: Absence of hypoxia  Outcome: Progressing Towards Goal  Goal: Promote optimal lung function  Outcome: Progressing Towards Goal     Problem: Breathing Pattern - Ineffective  Goal: Ability to achieve and maintain a regular respiratory rate  Outcome: Progressing Towards Goal     Problem:  Body Temperature -  Risk of, Imbalanced  Goal: Ability to maintain a body temperature within defined limits  Outcome: Progressing Towards Goal  Goal: Will regain or maintain usual level of consciousness  Outcome: Progressing Towards Goal  Goal: Complications related to the disease process, condition or treatment will be avoided or minimized  Outcome: Progressing Towards Goal     Problem: Isolation Precautions - Risk of Spread of Infection  Goal: Prevent transmission of infectious organism to others  Outcome: Progressing Towards Goal     Problem: Nutrition Deficits  Goal: Optimize nutrtional status  Outcome: Progressing Towards Goal     Problem: Risk for Fluid Volume Deficit  Goal: Maintain normal heart rhythm  Outcome: Progressing Towards Goal  Goal: Maintain absence of muscle cramping  Outcome: Progressing Towards Goal  Goal: Maintain normal serum potassium, sodium, calcium, phosphorus, and pH  Outcome: Progressing Towards Goal     Problem: Loneliness or Risk for Loneliness  Goal: Demonstrate positive use of time alone when socialization is not possible  Outcome: Progressing Towards Goal     Problem: Fatigue  Goal: Verbalize increase energy and improved vitality  Outcome: Progressing Towards Goal     Problem: Patient Education: Go to Patient Education Activity  Goal: Patient/Family Education  Outcome: Progressing Towards Goal     Problem: Pressure Injury - Risk of  Goal: *Prevention of pressure injury  Description: Document Adam Scale and appropriate interventions in the flowsheet. Outcome: Progressing Towards Goal  Note: Pressure Injury Interventions:  Sensory Interventions: Assess changes in LOC, Keep linens dry and wrinkle-free, Minimize linen layers    Moisture Interventions: Absorbent underpads, Minimize layers    Activity Interventions: Increase time out of bed, PT/OT evaluation    Mobility Interventions: HOB 30 degrees or less, Pressure redistribution bed/mattress (bed type)    Nutrition Interventions: Document food/fluid/supplement intake    Friction and Shear Interventions: Minimize layers, Apply protective barrier, creams and emollients                Problem: Patient Education: Go to Patient Education Activity  Goal: Patient/Family Education  Outcome: Progressing Towards Goal     Problem: Falls - Risk of  Goal: *Absence of Falls  Description: Document Heaven Fall Risk and appropriate interventions in the flowsheet.   Outcome: Progressing Towards Goal     Problem: Patient Education: Go to Patient Education Activity  Goal: Patient/Family Education  Outcome: Progressing Towards Goal

## 2022-11-21 NOTE — PROGRESS NOTES
Bedside and Verbal shift change report given to Naty Allen (oncoming nurse) by Martha (offgoing nurse).  Report included the following information SBAR, Kardex, ED Summary, MAR, Recent Results, and Cardiac Rhythm SB .

## 2022-11-21 NOTE — DISCHARGE SUMMARY
[facility] [address]  [address] [facility] [address]  [phone]       [address] [place] [address] SUMMARY        Name:  dA Lerma, 60 y.o.  :    1960  MRN:    [id number]  PCP:    None    Code: Full Code    Date of Admission: 2022  Date of Discharge:   2022 1:21 PM  Disposition:  LTC  Condition:  improved, stable, and good    Consultations:  None    Reason for Admission:   COVID-19 virus infection [U07.1]  Acute respiratory failure with hypoxia ([address] [J96.01]  AMS (altered mental status) [R41.82]    Discharge Diagnoses:    Acute respiratory failure with hypoxia due to COVID-19 AND COPD  COVID-19 virus infection  COPD with acute exacerbation due to COVID-19  Acute hypoxic encephalopathy POA  Elevated troponin and BNP POA  Elevated glucose hypoglycemia unsure about history of diabetes type 2 with neuropathy  Hypertension  Hyperkalemia mild 5.3 POA then resolved  Body mass index is 26.58 kg/m².:  25.0 - 29.9:  Overweight      Procedures:  None      Excerpted HPI from H&P of Marques Walker MD:  Mr. Teri Tejada is a 60 y.o. male with history that includes COPD, BPH, seizure history but no medications, question of diabetes neuropathy with elevated blood sugar and who comes from long-term care facility was diagnosed with COVID-19 and was about to come off isolation today however he was found to have altered mental status along with pulse ox of 55. He was bagged and sent to the ER. He was started on mid flow which initially thought to respond well but continued to desaturate and eventually required BiPAP. At the time of my visit patient was on BiPAP and although he appeared to be somewhat oriented he was somnolent and my review of system and history was very limited by this.       Brief Hospital Course:  Mr. Teri Tejada is a 60 y.o. male with history that includes COPD presents with AMS due to AHRF requiring BiPAP due to COV-19.      : Patient continues to be alert and oriented. Has done well off of oxygen this morning. Can return to oxygen as needed as previously at the long-term care facility. To that end the patient will be discharged today back to the [address] [place] [address] and rehab. Continue his previous medications including pulmonary meds and will discharge him on Decadron for 7 more days for total of 10 days. Follow-up with facility provider. 11/20: Patient resting comfortably. No acute events overnight and no new complaints. Patient appears to be more oriented today. He is aware that he was confused yesterday but cannot remember what happened. Very minimal shortness of breath if any. Still on oxygen     11/19: Patient seen and examined. Has improved and is off BiPAP. He remains somewhat disoriented though I don't know his baseline. Unaware of location and date. Visit Vitals  /86 (BP 1 Location: Left upper arm, BP Patient Position: At rest)   Pulse 60   Temp 97.6 °F (36.4 °C)   Resp 18   Ht 5' 9\" (1.753 m)   Wt 81.6 kg (180 lb)   SpO2 97%   BMI 26.58 kg/m²       Physical Exam:  General: No acute distress eating breakfast.  Off of oxygen. Head: Normocephalic, without obvious abnormality, atraumatic  Eyes: anicteric sclerae and conjuntiva clear  ENT: lips, mucosa, and tongue normal  Neck: normal, supple, and no tenderness  Lungs: clear to auscultation and normal respiratory effort   Heart: S1, S2 normal, regular rate, and regular rhythm  Abd: not distended, soft, nontender, BS present and normactive  Ext:  no cyanosis, bilateral lower extremity trace edema, and contracture of left leg.   Also has multiple toe indications on the right foot appears to only have third or fourth toe remaining    Skin: normal skin color, no rashes, and texture normal  Neuro:  alert, oriented to self & somehat to situation only, no defects noted in general exam.  Psych: not anxious, cooperative, appropriate affect          Labs:  Recent Labs     11/19/22  0709   WBC 9.3 HGB 12.6   HCT 42.4        Recent Labs     11/19/22  0709      K 4.2   CL 99   CO2 38*   GLU 93   BUN 21*   CREA 0.85   CA 8.8   MG 2.1   ALB 3.1*   TBILI 0.4   ALT 44     No results found. Immunizations Given During Admission:   None        PATIENT INSTRUCTIONS       Activity: Activity as tolerated  Diet: ADULT DIET Regular; 3 carb choices (45 gm/meal)  DIET ONE TIME MESSAGE  ADULT ORAL NUTRITION SUPPLEMENT Breakfast, Dinner; Diabetic Supplement   Wound Care:  None  Follow-up(s): Follow-up Information       Follow up With Specialties Details Why Contact Info    None    None ([phone]) Patient stated that they have no PCP      Facility Provider at [facility]  Follow up in 1 day(s) Posthospitalization follow-up for hypoxia due to COVID-19. No pneumonia. We will continue on Decadron x 7 days more            Current Discharge Medication List        START taking these medications    Details   dexAMETHasone (DECADRON) 4 mg tablet Take 4 mg by mouth Daily (before breakfast) for 7 days. Qty: 7 Tablet, Refills: 0           CONTINUE these medications which have NOT CHANGED    Details   mineral oil-hydrophil petrolat (Aquaphor) ointment Apply  to affected area two (2) times a day. aspirin delayed-release 81 mg tablet Take 81 mg by mouth daily. Indications: blood clot prevention following percutaneous coronary intervention      !! LORazepam (Ativan) 0.5 mg tablet Take 0.5 mg by mouth two (2) times a day. cholecalciferol (VITAMIN D3) (50,000 UNITS /1250 MCG) capsule Take 50,000 Units by mouth every seven (7) days. On Tuesdays      docusate sodium (COLACE) 100 mg capsule Take 100 mg by mouth two (2) times a day. ferrous sulfate 325 mg (65 mg iron) tablet Take 325 mg by mouth Daily (before breakfast). Saccharomyces boulardii (Florastor) 250 mg capsule Take 250 mg by mouth two (2) times a day. folic acid (FOLVITE) 1 mg tablet Take 1 mg by mouth daily.       gabapentin (NEURONTIN) 100 mg capsule Take 100 mg by mouth two (2) times a day. guaiFENesin (MUCINEX) 1,200 mg Ta12 ER tablet Take 600 mg by mouth two (2) times a day. multivitamin (ONE A DAY) tablet Take 1 Tablet by mouth daily. Senna 8.6 mg tablet Take 1 Tablet by mouth nightly. !! QUEtiapine (SEROqueL) 100 mg tablet Take 50 mg by mouth nightly. !! QUEtiapine (SEROqueL) 100 mg tablet Take 100 mg by mouth two (2) times a day. budesonide-formoteroL (Symbicort) 160-4.5 mcg/actuation HFAA Take 1 Puff by inhalation two (2) times a day. tamsulosin (FLOMAX) 0.4 mg capsule Take 0.4 mg by mouth nightly. Indications: enlarged prostate with urination problem      thiamine HCL (B-1) 100 mg tablet Take 100 mg by mouth daily. traZODone (DESYREL) 100 mg tablet Take 100 mg by mouth nightly. ascorbic acid, vitamin C, (Vitamin C) 500 mg tablet Take 500 mg by mouth two (2) times a day. sertraline (Zoloft) 25 mg tablet Take 25 mg by mouth daily. Indications: major depressive disorder      acetaminophen (TYLENOL) 500 mg tablet Take 500 mg by mouth every eight (8) hours as needed for Pain. albuterol (PROVENTIL HFA, VENTOLIN HFA, PROAIR HFA) 90 mcg/actuation inhaler Take 2 Puffs by inhalation every four (4) hours as needed for Wheezing. phenytoin ER (DILANTIN) 100 mg ER capsule Take 3 Caps by mouth nightly. Qty: 100 Cap, Refills: 0      !! LORazepam (ATIVAN) 2 mg tablet Take 1 Tab by mouth every eight (8) hours as needed for Anxiety. Max Daily Amount: 6 mg. Qty: 20 Tab, Refills: 0       !! - Potential duplicate medications found. Please discuss with provider.           Discharge Plan D/W:  Patient, RN, and Care Manager          **Total time spent coordinating discharge (preparing & going over instructions, follow-ups, prescriptions, and documentation):  35 minutes                 ___________________________________________________    Admitting Physician: Rockney Gitelman, MD   Date of service:    11/21/2022         CC: None

## 2022-11-21 NOTE — PROGRESS NOTES
11/21/2022  1:56 PM  [facility] confirmed for 5pm.  Nursing notified. 1:11 PM  CM faxed discharge clinicals to [facility], [phone]. [facility] request sent via CME. Pending response. Nursing, please call report to: [phone], pt will be going to room 105A. PCS form on pt's hard chart, please add updated MAR and AVS to packet. Transition of Care Plan to SNF/Rehab    SNF/Rehab Transition:  Patient has been accepted to [facility] and meets criteria for admission. Patient will transported by [facility] and expected to leave at     Communication to Patient/Family:  Met with patient and (identified care giver) and they are agreeable to the transition plan. Communication to SNF/Rehab:  Bedside RN, Reji Falcon has been notified to update the transition plan to the facility and call report (phone number [phone]). Discharge information has been updated on the AVS.     Discharge instructions to be fax'd to facility at [facility] # [phone]). [] BCPI-A  Patient has been identified as part of the BCPI-A Program.  For Care Coordination associated with that Bundle Program, please contact. Bundle information has been communication to. Nursing Please include all hard scripts for controlled substances, med rec and dc summary, and AVS in packet. Reviewed and confirmed with facility, can manage the patient care needs for the following:     SNF/Rehab Transition:  Patient to follow-up with Home Health:  [facility], other ,none)  PCP/Specialist:    Reviewed and confirmed with facility, they can manage the patient care needs for the following:     Bertrand Linea with (X) only those applicable:    Medication:  [x]  Medications will be available at the facility  []  IV Antibiotics   []  Controlled Substance - hard copy to be sent with patient   []  Weekly Labs   Documents:  [x] Hard RX  [] MAR  [] Kardex  [] AVS  []Transfer Summary  [x]Discharge   Equipment:  []  CPAP/BiPAP  []  Wound Vacuum  [] Harmon or Urinary Device  []  PICC/Central Line  []  Nebulizer  []  Ventilator   Treatment:  []Isolation (for MRSA, VRE, etc.)  []Surgical Drain Management  []Tracheostomy Care  []Dressing Changes  []Dialysis with transportation and chair time. []PEG Care  []Oxygen  []Daily Weights for Heart Failure   Dietary:  []Any diet limitations  []Tube Feedings   []Total Parenteral Management (TPN)   Eligible for Medicaid Long Term Services and Supports  Yes:  [] Eligible for medical assistance or will become eligible within 180 days and UAI completed. [] Provider/Patient and/or support system has requested screening. [] UAI copy provided to patient or responsible party  [] UAI unavailable at discharge will send once processed to SNF provider. [] UAI unavailable at discharged mailed to patient  No:   [] Private pay and is not financially eligible for Medicaid within the next 180 days. [] Reside out-of-state. [] A residents of a state owned/operated facility that is licensed  by [facility] or [facility]  [] Enrollment in [facility] hospice services  []  [address]  [] Patient /Family declines to have screening completed or provide financial information for screening     Financial Resources:  Medicaid    [] Initiated and application pending   [] Full coverage     Advanced Care Plan:  []Surrogate Decision Maker of Care  []POA  []Communicated Code Status, FULL CODE (DDNR\", \"Full\")    Other           11:39 AM  Case Management Note:    Cm called [facility], they are able to take patient back on he's stable for discharge. CM notified Dr. Alyssa Pereira. [facility] is requesting rapid Covid test prior to return. CM notified RN and will put order in.       Anibal Dia

## 2022-11-21 NOTE — DISCHARGE INSTRUCTIONS
Patient Discharge Instructions    Chriss Pugh / [id number] : 1960    Admitted 2022 Discharged: 2022       Discharge Medications: It is important that you take the medication exactly as they are prescribed. Keep your medication in the bottles provided by the pharmacist and keep a list of the medication names, dosages, and times to be taken in your wallet. Do not take other medications without consulting your doctor. Call your PCP for medication refills      What to do at Home    Take medications as prescribed and follow up with Facility provider and PCP. Call your PCP for refills of your medications.       Recommended Diet: ADULT DIET Regular; 3 carb choices (45 gm/meal)  DIET ONE TIME MESSAGE  ADULT ORAL NUTRITION SUPPLEMENT Breakfast, Dinner; Diabetic Supplement       Recommended Activity: Activity as tolerated      **If you experience any of the following symptoms chest pain, shortness of breath, fevers, chills, or signs of infection, please follow up with PCP or go to the nearest ER.**         Tim Bermudez MD     2022

## 2022-11-21 NOTE — PROGRESS NOTES
1900  Bedside and Verbal shift change report given to Brendan Dugan RN/Latia RN (oncoming nurse) by Bailee Covington RN (offgoing nurse). Report included the following information SBAR, Kardex, Intake/Output, MAR, Recent Results, and Cardiac Rhythm NSR/SB . This patient was assisted with Intentional Toileting every 2 hours during this shift as appropriate. Documentation of ambulation and output reflected on Flowsheet as appropriate. Purposeful hourly rounding was completed using AIDET and 5Ps. Outcomes of PHR documented as they occurred. Bed alarm in use as appropriate. Dual Suction and ambubag in place. 0700  Bedside and Verbal shift change report given to Deya Abbott RN (oncoming nurse) by Brendan Dugan RN/ZOEY Jeffery (offgoing nurse). Report included the following information SBAR, Kardex, Intake/Output, MAR, Recent Results, and Cardiac Rhythm NSR/SB .

## 2022-11-21 NOTE — PROGRESS NOTES
0730- Bedside and Verbal shift change report given to Nancy Adler RN (oncoming nurse) by ZOEY Thomas (offgoing nurse). Report included the following information SBAR, Kardex, ED Summary, OR Summary, Procedure Summary, Intake/Output, MAR, Accordion, and Recent Results. This patient was assisted with Intentional Toileting every 2 hours during this shift. Documentation of ambulation and output reflected on Flowsheet. 1615- Report called to Po Lenz. [facility] ETA 1700.     1730- [facility] delayed.  ETA now [phone]

## 2022-11-22 LAB
FLUID CULTURE, SPNG2: NORMAL
L PNEUMO1 AG UR QL IA: NEGATIVE
ORGANISM ID, SPNG3: NORMAL
PLEASE NOTE, SPNG4: NORMAL
S PNEUM AG SPEC QL LA: NEGATIVE
SPECIMEN SOURCE: NORMAL
SPECIMEN SOURCE: NORMAL
SPECIMEN, SPNG1: NORMAL

## 2022-11-24 LAB
BACTERIA SPEC CULT: NORMAL
BACTERIA SPEC CULT: NORMAL
SERVICE CMNT-IMP: NORMAL
SERVICE CMNT-IMP: NORMAL
TROPONIN T SERPL-MCNC: 62 NG/L (ref 0–22)